# Patient Record
Sex: FEMALE | Race: WHITE | NOT HISPANIC OR LATINO | Employment: FULL TIME | URBAN - METROPOLITAN AREA
[De-identification: names, ages, dates, MRNs, and addresses within clinical notes are randomized per-mention and may not be internally consistent; named-entity substitution may affect disease eponyms.]

---

## 2017-01-10 ENCOUNTER — OFFICE VISIT (OUTPATIENT)
Dept: FAMILY MEDICINE | Facility: CLINIC | Age: 47
End: 2017-01-10
Payer: COMMERCIAL

## 2017-01-10 VITALS
TEMPERATURE: 97.4 F | BODY MASS INDEX: 29.32 KG/M2 | DIASTOLIC BLOOD PRESSURE: 72 MMHG | HEIGHT: 65 IN | WEIGHT: 176 LBS | SYSTOLIC BLOOD PRESSURE: 122 MMHG | HEART RATE: 70 BPM

## 2017-01-10 DIAGNOSIS — N91.2 AMENORRHEA: ICD-10-CM

## 2017-01-10 DIAGNOSIS — F41.9 ANXIETY: ICD-10-CM

## 2017-01-10 DIAGNOSIS — R22.0 NASAL SWELLING: ICD-10-CM

## 2017-01-10 DIAGNOSIS — J45.21 MILD INTERMITTENT ASTHMA WITH ACUTE EXACERBATION: ICD-10-CM

## 2017-01-10 DIAGNOSIS — Z12.31 VISIT FOR SCREENING MAMMOGRAM: ICD-10-CM

## 2017-01-10 DIAGNOSIS — Z30.432 ENCOUNTER FOR IUD REMOVAL: ICD-10-CM

## 2017-01-10 DIAGNOSIS — J30.89 SEASONAL ALLERGIC RHINITIS DUE TO OTHER ALLERGIC TRIGGER: ICD-10-CM

## 2017-01-10 DIAGNOSIS — F51.01 PRIMARY INSOMNIA: ICD-10-CM

## 2017-01-10 DIAGNOSIS — R07.9 CHEST PAIN, UNSPECIFIED TYPE: ICD-10-CM

## 2017-01-10 DIAGNOSIS — Z13.220 LIPID SCREENING: ICD-10-CM

## 2017-01-10 DIAGNOSIS — H10.45 CHRONIC ALLERGIC CONJUNCTIVITIS: ICD-10-CM

## 2017-01-10 DIAGNOSIS — Z13.1 SCREENING FOR DIABETES MELLITUS: ICD-10-CM

## 2017-01-10 DIAGNOSIS — Z00.01 ENCOUNTER FOR ROUTINE ADULT HEALTH EXAMINATION WITH ABNORMAL FINDINGS: Primary | ICD-10-CM

## 2017-01-10 LAB
CHOLEST SERPL-MCNC: 196 MG/DL
FSH SERPL-ACNC: 8.2 IU/L
GLUCOSE SERPL-MCNC: 94 MG/DL (ref 70–99)
HDLC SERPL-MCNC: 86 MG/DL
LDLC SERPL CALC-MCNC: 92 MG/DL
NONHDLC SERPL-MCNC: 110 MG/DL
TRIGL SERPL-MCNC: 90 MG/DL

## 2017-01-10 PROCEDURE — 58301 REMOVE INTRAUTERINE DEVICE: CPT | Performed by: PHYSICIAN ASSISTANT

## 2017-01-10 PROCEDURE — 36415 COLL VENOUS BLD VENIPUNCTURE: CPT | Performed by: PHYSICIAN ASSISTANT

## 2017-01-10 PROCEDURE — 82947 ASSAY GLUCOSE BLOOD QUANT: CPT | Performed by: PHYSICIAN ASSISTANT

## 2017-01-10 PROCEDURE — 99396 PREV VISIT EST AGE 40-64: CPT | Mod: 25 | Performed by: PHYSICIAN ASSISTANT

## 2017-01-10 PROCEDURE — 99213 OFFICE O/P EST LOW 20 MIN: CPT | Mod: 25 | Performed by: PHYSICIAN ASSISTANT

## 2017-01-10 PROCEDURE — 93000 ELECTROCARDIOGRAM COMPLETE: CPT | Mod: 59 | Performed by: PHYSICIAN ASSISTANT

## 2017-01-10 PROCEDURE — 80061 LIPID PANEL: CPT | Performed by: PHYSICIAN ASSISTANT

## 2017-01-10 PROCEDURE — 83001 ASSAY OF GONADOTROPIN (FSH): CPT | Performed by: PHYSICIAN ASSISTANT

## 2017-01-10 RX ORDER — TRAZODONE HYDROCHLORIDE 50 MG/1
25-50 TABLET, FILM COATED ORAL AT BEDTIME
Qty: 30 TABLET | Refills: 1 | Status: SHIPPED | OUTPATIENT
Start: 2017-01-10 | End: 2017-12-15

## 2017-01-10 RX ORDER — FLUTICASONE PROPIONATE 110 UG/1
2 AEROSOL, METERED RESPIRATORY (INHALATION) 2 TIMES DAILY
Qty: 1 INHALER | Refills: 1 | Status: SHIPPED | OUTPATIENT
Start: 2017-01-10 | End: 2017-08-09

## 2017-01-10 RX ORDER — CETIRIZINE HYDROCHLORIDE 10 MG/1
10 TABLET ORAL EVERY EVENING
Qty: 90 TABLET | Refills: 3 | Status: SHIPPED | OUTPATIENT
Start: 2017-01-10 | End: 2017-12-15

## 2017-01-10 RX ORDER — ALBUTEROL SULFATE 90 UG/1
2 AEROSOL, METERED RESPIRATORY (INHALATION) EVERY 6 HOURS PRN
Qty: 1 INHALER | Refills: 0 | Status: SHIPPED | OUTPATIENT
Start: 2017-01-10 | End: 2017-07-21

## 2017-01-10 RX ORDER — OLOPATADINE HYDROCHLORIDE 1 MG/ML
1 SOLUTION/ DROPS OPHTHALMIC 2 TIMES DAILY
Qty: 1 BOTTLE | Refills: 12 | Status: SHIPPED | OUTPATIENT
Start: 2017-01-10 | End: 2017-12-15

## 2017-01-10 RX ORDER — FLUTICASONE PROPIONATE 50 MCG
2 SPRAY, SUSPENSION (ML) NASAL DAILY
Qty: 16 G | Refills: 11 | Status: SHIPPED | OUTPATIENT
Start: 2017-01-10 | End: 2017-07-21

## 2017-01-10 RX ORDER — ALPRAZOLAM 0.5 MG
0.5 TABLET ORAL 3 TIMES DAILY PRN
Qty: 30 TABLET | Refills: 1 | Status: SHIPPED | OUTPATIENT
Start: 2017-01-10 | End: 2017-12-15

## 2017-01-10 ASSESSMENT — PATIENT HEALTH QUESTIONNAIRE - PHQ9: 5. POOR APPETITE OR OVEREATING: NOT AT ALL

## 2017-01-10 ASSESSMENT — ANXIETY QUESTIONNAIRES
5. BEING SO RESTLESS THAT IT IS HARD TO SIT STILL: NOT AT ALL
6. BECOMING EASILY ANNOYED OR IRRITABLE: SEVERAL DAYS
2. NOT BEING ABLE TO STOP OR CONTROL WORRYING: NOT AT ALL
1. FEELING NERVOUS, ANXIOUS, OR ON EDGE: SEVERAL DAYS
7. FEELING AFRAID AS IF SOMETHING AWFUL MIGHT HAPPEN: NOT AT ALL
GAD7 TOTAL SCORE: 2
3. WORRYING TOO MUCH ABOUT DIFFERENT THINGS: NOT AT ALL

## 2017-01-10 NOTE — Clinical Note
[facility]  [address] [place] [id number] [phone]      January 11, 2017      Hannah Sung  [address]   [facility] [address]          Dear Ms. Sung    The results of your recent lab tests were within normal limits. Enclosed is a copy of these results.  If you have any further questions or problems, please contact our office.    Sincerely,      Lenora Amor PA-C/ap    Results for orders placed or performed in visit on 01/10/17   Follicle stimulating hormone   Result Value Ref Range    FSH 8.2 IU/L   Lipid panel reflex to direct LDL   Result Value Ref Range    Cholesterol 196 <200 mg/dL    Triglycerides 90 <150 mg/dL    HDL Cholesterol 86 >49 mg/dL    LDL Cholesterol Calculated 92 <100 mg/dL    Non HDL Cholesterol 110 <130 mg/dL   Glucose   Result Value Ref Range    Glucose 94 70 - 99 mg/dL

## 2017-01-10 NOTE — NURSING NOTE
"Chief Complaint   Patient presents with     Physical     Flu Shot     due     Other     due for health maintenance     Refill Request     Allergies     have been very bad the last few days, may be mold issues in her office       Initial /72 mmHg  Pulse 70  Temp(Src) 97.4  F (36.3  C) (Oral)  Ht 5' 4.5\" (1.638 m)  Wt 176 lb (79.833 kg)  BMI 29.75 kg/m2 Estimated body mass index is 29.75 kg/(m^2) as calculated from the following:    Height as of this encounter: 5' 4.5\" (1.638 m).    Weight as of this encounter: 176 lb (79.833 kg).  BP completed using cuff size: veronica Potts MA      "

## 2017-01-10 NOTE — Clinical Note
[address] [id number] [phone]      January 18, 2017      Hannah Sung  [address]   [facility] [address]              Dear Hannah,    Your FSH shows you are still menstruating. If your bleeding becomes a problem, or you desire contraception, please let me know. You should expect to have a period again now that your IUD is out.  Please let me know if you have further questions or concerns.    Results for orders placed or performed in visit on 01/10/17   Follicle stimulating hormone   Result Value Ref Range    FSH 8.2 IU/L   Lipid panel reflex to direct LDL   Result Value Ref Range    Cholesterol 196 <200 mg/dL    Triglycerides 90 <150 mg/dL    HDL Cholesterol 86 >49 mg/dL    LDL Cholesterol Calculated 92 <100 mg/dL    Non HDL Cholesterol 110 <130 mg/dL   Glucose   Result Value Ref Range    Glucose 94 70 - 99 mg/dL           Sincerely,    Lenora Amor PA-C

## 2017-01-10 NOTE — PROGRESS NOTES
SUBJECTIVE:     CC: Hannah Sung is an 46 year old woman who presents for preventive health visit.     Healthy Habits:    Do you get at least three servings of calcium containing foods daily (dairy, green leafy vegetables, etc.)? Yes, yogurt daily, cheese also    Amount of exercise or daily activities, outside of work: 4-5 day(s) per week    Problems taking medications regularly No    Medication side effects: No    Have you had an eye exam in the past two years? yes    Do you see a dentist twice per year? No, due for one, last seen in spring of last year    Do you have sleep apnea, excessive snoring or daytime drowsiness?no    1. History of asthma. Using albuterol almost daily right now because of allergens. She states her allergies have been very bad the last few days, worried her office has mold issues. She stated they will be testing the office with a mold kit. Eyes are very red and itchy  2.swelling of outside of nose on and off for a few months. Not sure if realted to allergies.  3. Overall, anxiety is much better, uses xanax very sparingly.   4. Discuss birth control, time for her Mirena to come out, open to options. Not sexually active. Wondering about what period will be like when this comes out. Very mild spotting here and there  5. New chest pain that occurred one time several weeks ago at rest. She states it was left sided sharp pain that lasted for minutes -- maybe up to an hour. Denies shortness of breath, diaphoresis. She exercises almost daily without symptoms.     Today's PHQ-2 Score:   PHQ-2 ( 1999 [employer]) 1/10/2017 6/16/2015   Q1: Little interest or pleasure in doing things 0 0   Q2: Feeling down, depressed or hopeless 0 0   PHQ-2 Score 0 0       Abuse: Current or Past(Physical, Sexual or Emotional)- No  Do you feel safe in your environment - Yes    Social History   Substance Use Topics     Smoking status: Never Smoker      Smokeless tobacco: Never Used     Alcohol Use: Yes      Comment: wine  "occasionally     The patient does not drink >3 drinks per day nor >7 drinks per week.    Recent Labs   Lab Test  04/21/15   0933   CHOL  189   HDL  96   LDL  77   TRIG  78   CHOLHDLRATIO  2.0       Reviewed orders with patient.  Reviewed health maintenance and updated orders accordingly - Yes    Mammo Decision Support:  Patient under age 50, mutual decision reflected in health maintenance.      Pertinent mammograms are reviewed under the imaging tab.  History of abnormal Pap smear: NO - age 30- 65 PAP every 3 years recommended  All Histories reviewed and updated in Epic.      ROS:  A pertinent ROS of the General  HEENT  Cardiovascular  Pulmonary  GI  Musculoskeletal   Neurological  Integumentary  Psychological   systems is otherwise unremarkable.         Problem list, Medication list, Allergies, and Medical/Social/Surgical histories reviewed in [facility] and updated as appropriate.  OBJECTIVE:     /72 mmHg  Pulse 70  Temp(Src) 97.4  F (36.3  C) (Oral)  Ht 5' 4.5\" (1.638 m)  Wt 176 lb (79.833 kg)  BMI 29.75 kg/m2  EXAM:  GENERAL: healthy, alert and no distress  EYES: Eyes grossly normal to inspection, PERRL and conjunctivae and sclerae normal  HENT: ear canals and TM's normal, nose and mouth without ulcers or lesions,  slight raised area of left side of external nose  NECK: no adenopathy, no asymmetry, masses, or scars and thyroid normal to palpation  RESP: lungs clear to auscultation - no rales, rhonchi or wheezes  BREAST: normal without masses, tenderness or nipple discharge and no palpable axillary masses or adenopathy  CV: regular rate and rhythm, normal S1 S2, no S3 or S4, no murmur, click or rub, no peripheral edema and peripheral pulses strong  ABDOMEN: soft, nontender, no hepatosplenomegaly, no masses and bowel sounds normal   (female): normal female external genitalia, normal urethral meatus, vaginal mucosa pink, moist, well rugated, and normal cervix/adnexa/uterus without masses or discharge   " (female): IUD in place, removed without difficulty, some mild bleeding in vaginal vault.  MS: no gross musculoskeletal defects noted, no edema  SKIN: no suspicious lesions or rashes  NEURO: Normal strength and tone, mentation intact and speech normal  PSYCH: mentation appears normal, affect normal/bright    ASSESSMENT/PLAN:     1. Encounter for routine adult health examination with abnormal findings    2. Mild intermittent asthma with acute exacerbation  Refills, add Flovent to regimen.   Make sure to take allergy meds.  Recheck in 1 month  - albuterol (PROAIR HFA/PROVENTIL HFA/VENTOLIN HFA) 108 (90 BASE) MCG/ACT Inhaler; Inhale 2 puffs into the lungs every 6 hours as needed for shortness of breath / dyspnea or wheezing Due for a visit for refills!!!  Dispense: 1 Inhaler; Refill: 0  - fluticasone (FLOVENT HFA) 110 MCG/ACT Inhaler; Inhale 2 puffs into the lungs 2 times daily  Dispense: 1 Inhaler; Refill: 1  - cetirizine (ZYRTEC) 10 MG tablet; Take 1 tablet (10 mg) by mouth every evening  Dispense: 90 tablet; Refill: 3  - OFFICE/OUTPT VISIT,EST,LEVL III    3. Seasonal allergic rhinitis due to other allergic trigger  - albuterol (PROAIR HFA/PROVENTIL HFA/VENTOLIN HFA) 108 (90 BASE) MCG/ACT Inhaler; Inhale 2 puffs into the lungs every 6 hours as needed for shortness of breath / dyspnea or wheezing Due for a visit for refills!!!  Dispense: 1 Inhaler; Refill: 0  - fluticasone (FLONASE) 50 MCG/ACT spray; Spray 2 sprays into both nostrils daily  Dispense: 16 g; Refill: 11  - OFFICE/OUTPT VISIT,EST,LEVL III    4. Encounter for IUD removal  IUD Removal  IUD was removed without difficult. She had mild cramping afterwards, but otherwise normal.     5. Nasal swelling  Difficult to tell if this is skin related or nares related. REcommend flonase for allergies and if persists see derm.  - OFFICE/OUTPT VISIT,EST,LEVL III    6. Anxiety  Okay to refill xanax PRN.  - ALPRAZolam (XANAX) 0.5 MG tablet; Take 1 tablet (0.5 mg) by mouth 3  "times daily as needed for anxiety  Dispense: 30 tablet; Refill: 1  - OFFICE/OUTPT VISIT,EST,LEVL III    7. Chronic allergic conjunctivitis  Refill.  - olopatadine (PATANOL) 0.1 % ophthalmic solution; Place 1 drop into both eyes 2 times daily  Dispense: 1 Bottle; Refill: 12  - OFFICE/OUTPT VISIT,EST,LEVL III    8. Primary insomnia  Trial of meds for sleep.  - traZODone (DESYREL) 50 MG tablet; Take 0.5-1 tablets (25-50 mg) by mouth At Bedtime  Dispense: 30 tablet; Refill: 1  - OFFICE/OUTPT VISIT,EST,LEVL III    9. Amenorrhea  Discussed role of checking FSH now that IUD is out. She does not need contraception and will see how periods go without her IUD.  - Follicle stimulating hormone  - OFFICE/OUTPT VISIT,EST,LEVL III    11. Chest pain, unspecified type  Chest pain does not seem concerning for cardiac etiology. She exercises without difficulty. Will recheck in 1 month. Warned patient to proceed to ER with severe chest pain associated with diaphoresis, shortness of breath or exercise.   - EKG 12-lead complete w/read - Clinics    12. Lipid screening  - Lipid panel reflex to direct LDL    13. Screening for diabetes mellitus  - Glucose    14. Visit for screening mammogram  - MA SCREENING DIGITAL BILAT - Future  (s+30); Future    COUNSELING:   Reviewed preventive health counseling, as reflected in patient instructions       Regular exercise       Healthy diet/nutrition         reports that she has never smoked. She has never used smokeless tobacco.    Estimated body mass index is 29.75 kg/(m^2) as calculated from the following:    Height as of this encounter: 5' 4.5\" (1.638 m).    Weight as of this encounter: 176 lb (79.833 kg).   Weight management plan: Discussed healthy diet and exercise guidelines and patient will follow up in 1 month in clinic to re-evaluate.    Counseling Resources:  ATP IV Guidelines  Pooled Cohorts Equation Calculator  Breast Cancer Risk Calculator  FRAX Risk Assessment  ICSI Preventive " Guidelines  Dietary Guidelines for Americans, 2010  USDA's MyPlate  ASA Prophylaxis  Lung CA Screening    Lenora Amor PA-C  [facility]

## 2017-01-11 ASSESSMENT — ANXIETY QUESTIONNAIRES: GAD7 TOTAL SCORE: 2

## 2017-01-11 ASSESSMENT — PATIENT HEALTH QUESTIONNAIRE - PHQ9: SUM OF ALL RESPONSES TO PHQ QUESTIONS 1-9: 2

## 2017-01-11 ASSESSMENT — ASTHMA QUESTIONNAIRES: ACT_TOTALSCORE: 19

## 2017-01-27 ENCOUNTER — TELEPHONE (OUTPATIENT)
Dept: FAMILY MEDICINE | Facility: CLINIC | Age: 47
End: 2017-01-27

## 2017-01-27 DIAGNOSIS — N63.0 BREAST MASS: Primary | ICD-10-CM

## 2017-01-27 NOTE — TELEPHONE ENCOUNTER
Reason for Call: Request for an order or referral:    Order or referral being requested: [facility], needs order signed by MD    Date needed: as soon as possible    Has the patient been seen by the PCP for this problem? YES    Additional comments: [facility] calling, needs orders signed, needs to contact patient to schedule    Phone number Patient can be reached at:  Other phone number:  [facility], [phone]    Best Time:  any    Can we leave a detailed message on this number?  YES    Call taken on 1/27/2017 at 10:12 AM by Rubi Gonzalez

## 2017-01-29 ENCOUNTER — TELEPHONE (OUTPATIENT)
Dept: FAMILY MEDICINE | Facility: CLINIC | Age: 47
End: 2017-01-29

## 2017-01-30 NOTE — TELEPHONE ENCOUNTER
She was notified and has imaging scheduled for 2/7/17.     Mihaela Breaux RN   January 30, 2017 11:56 AM  [facility] Triage   [phone]

## 2017-01-30 NOTE — TELEPHONE ENCOUNTER
Hi team, received information that patient needs further breast imaging. Please call patient to make sure she is aware and help scheduling if necessary.   Thank you,    Lenora Amor, MICHAEL, PA-C  [facility]

## 2017-07-21 ENCOUNTER — TELEPHONE (OUTPATIENT)
Dept: FAMILY MEDICINE | Facility: CLINIC | Age: 47
End: 2017-07-21

## 2017-07-21 DIAGNOSIS — J30.89 SEASONAL ALLERGIC RHINITIS DUE TO OTHER ALLERGIC TRIGGER: ICD-10-CM

## 2017-07-21 DIAGNOSIS — J45.21 MILD INTERMITTENT ASTHMA WITH ACUTE EXACERBATION: ICD-10-CM

## 2017-07-21 RX ORDER — FLUTICASONE PROPIONATE 50 MCG
2 SPRAY, SUSPENSION (ML) NASAL DAILY
Qty: 16 G | Refills: 11 | Status: SHIPPED | OUTPATIENT
Start: 2017-07-21 | End: 2017-12-15

## 2017-07-21 RX ORDER — ALBUTEROL SULFATE 90 UG/1
2 AEROSOL, METERED RESPIRATORY (INHALATION) EVERY 6 HOURS PRN
Qty: 1 INHALER | Refills: 0 | Status: SHIPPED | OUTPATIENT
Start: 2017-07-21 | End: 2017-12-15

## 2017-07-21 NOTE — TELEPHONE ENCOUNTER
Routing refill request to provider for review/approval because:  Unable to approve under Mary Alicea's name, will route to current PCP      Fiona Arriaga RN  [facility]

## 2017-07-21 NOTE — TELEPHONE ENCOUNTER
Reason for Call:  Medication or medication refill:    Do you use a [facility]?  Name of the pharmacy and phone number for the current request:  [facility] in [place]    Name of the medication requested: albuterol (PROAIR HFA/PROVENTIL HFA/VENTOLIN HFA) 108 (90 BASE) MCG/ACT Inhaler    Other request: Pt is going out of town this weekend and needs it. Saw Lenora for a physical in January. Pt is not sure why she does not have refills available.    Can we leave a detailed message on this number? YES    Phone number patient can be reached at: Home number on file [phone] (home)    Best Time: any    Call taken on 7/21/2017 at 9:34 AM by Betty Harris

## 2017-07-31 NOTE — TELEPHONE ENCOUNTER
ACT=22. Informed that PCP is no longer with [facility], scheduled with another provider next week.  Rashmi Meyer RN

## 2017-08-01 ASSESSMENT — ASTHMA QUESTIONNAIRES: ACT_TOTALSCORE: 22

## 2017-08-09 ENCOUNTER — OFFICE VISIT (OUTPATIENT)
Dept: FAMILY MEDICINE | Facility: CLINIC | Age: 47
End: 2017-08-09
Payer: COMMERCIAL

## 2017-08-09 VITALS
SYSTOLIC BLOOD PRESSURE: 102 MMHG | TEMPERATURE: 98.4 F | DIASTOLIC BLOOD PRESSURE: 74 MMHG | HEART RATE: 60 BPM | HEIGHT: 65 IN

## 2017-08-09 DIAGNOSIS — J45.30 MILD PERSISTENT ASTHMA WITHOUT COMPLICATION: Primary | ICD-10-CM

## 2017-08-09 DIAGNOSIS — J30.2 SEASONAL ALLERGIC RHINITIS, UNSPECIFIED CHRONICITY, UNSPECIFIED TRIGGER: ICD-10-CM

## 2017-08-09 PROCEDURE — 99213 OFFICE O/P EST LOW 20 MIN: CPT | Performed by: PHYSICIAN ASSISTANT

## 2017-08-09 RX ORDER — FLUTICASONE PROPIONATE 110 UG/1
2 AEROSOL, METERED RESPIRATORY (INHALATION) 2 TIMES DAILY
Qty: 3 INHALER | Refills: 3 | Status: SHIPPED | OUTPATIENT
Start: 2017-08-09 | End: 2017-12-15

## 2017-08-09 NOTE — MR AVS SNAPSHOT
After Visit Summary   8/9/2017    Hannah Sung    MRN: [id number]           Patient Information     Date Of Birth          1970        Visit Information        Provider Department      8/9/2017 2:20 PM Sarah Simmons PA-C [facility]        Today's Diagnoses     Mild persistent asthma without complication    -  1    Seasonal allergic rhinitis, unspecified chronicity, unspecified trigger          Care Instructions    Call Allergy/Asthma to schedule an appointment  Refill sent to pharmacy.      ALYCIA Prince PA-C            Follow-ups after your visit        Additional Services     ALLERGY/ASTHMA ADULT REFERRAL       Your provider has referred you to: [facility]: [facility] [phone]  [email or web address]    Please be aware that coverage of these services is subject to the terms and limitations of your health insurance plan.  Call member services at your health plan with any benefit or coverage questions.      Please bring the following with you to your appointment:    (1) Any X-Rays, CTs or MRIs which have been performed.  Contact the facility where they were done to arrange for  prior to your scheduled appointment.    (2) List of current medications  (3) This referral request   (4) Any documents/labs given to you for this referral                  Who to contact     If you have questions or need follow up information about today's clinic visit or your schedule please contact [facility] directly at [phone].  Normal or non-critical lab and imaging results will be communicated to you by MyChart, letter or phone within 4 business days after the clinic has received the results. If you do not hear from us within 7 days, please contact the clinic through MyChart or phone. If you have a critical or abnormal lab result, we will notify you by phone as soon as possible.  Submit refill requests through  "[employer] or call your pharmacy and they will forward the refill request to us. Please allow 3 business days for your refill to be completed.          Additional Information About Your Visit        MyChart Information     [employer]iously[employer] lets you send messages to your doctor, view your test results, renew your prescriptions, schedule appointments and more. To sign up, go to [email or web address] . Click on \"Log in\" on the left side of the screen, which will take you to the Welcome page. Then click on \"Sign up Now\" on the right side of the page.     You will be asked to enter the access code listed below, as well as some personal information. Please follow the directions to create your username and password.     Your access code is: [email or web address]  Expires: 2017  3:25 PM     Your access code will  in 90 days. If you need help or a new code, please call your [place] clinic or [phone].        Care EveryWhere ID     This is your Care EveryWhere ID. This could be used by other organizations to access your [place] medical records  [id number]        Your Vitals Were     Pulse Temperature Height             60 98.4  F (36.9  C) (Oral) 5' 4.5\" (1.638 m)          Blood Pressure from Last 3 Encounters:   17 102/74   01/10/17 122/72   06/16/15 122/76    Weight from Last 3 Encounters:   01/10/17 176 lb (79.8 kg)   06/16/15 172 lb (78 kg)   06/04/15 173 lb 6.4 oz (78.7 kg)              We Performed the Following     ALLERGY/ASTHMA ADULT REFERRAL          Today's Medication Changes          These changes are accurate as of: 17  3:30 PM.  If you have any questions, ask your nurse or doctor.               Start taking these medicines.        Dose/Directions    order for DME   Used for:  Mild persistent asthma without complication   Started by:  Sarah Simmons PA-C        Nebulizer   Quantity:  1 Device   Refills:  0            Where to get your medicines      These medications were sent to [facility] - " [place] Mor[facility] - [address] [facility]   [address] [place] [address]     Phone:  [phone]     fluticasone 110 MCG/ACT Inhaler         Some of these will need a paper prescription and others can be bought over the counter.  Ask your nurse if you have questions.     Bring a paper prescription for each of these medications     order for DME                Primary Care Provider Office Phone # Fax #    Lenora Amor PA-C [phone]       [facility] [address]        Equal Access to Services     [facility]: Hadii aad ku hadasho Soomaali, waaxda luqadaha, qaybta kaalmada adematiasyada, mandi maddox . So [facility] [phone].    ATENCIÓN: Si habla español, tiene a hurtado disposición servicios gratuitos de asistencia lingüística. [facility] [phone].    We comply with applicable federal civil rights laws and [place] laws. We do not discriminate on the basis of race, color, national origin, age, disability sex, sexual orientation or gender identity.            Thank you!     Thank you for choosing [facility]  for your care. Our goal is always to provide you with excellent care. Hearing back from our patients is one way we can continue to improve our services. Please take a few minutes to complete the written survey that you may receive in the mail after your visit with us. Thank you!             Your Updated Medication List - Protect others around you: Learn how to safely use, store and throw away your medicines at www.disposemymeds.org.          This list is accurate as of: 8/9/17  3:30 PM.  Always use your most recent med list.                   Brand Name Dispense Instructions for use Diagnosis    * albuterol (5 MG/ML) 0.5% neb solution    PROVENTIL    30 vial    Take 0.5 mLs (2.5 mg) by nebulization every 6 hours as needed    Mild intermittent asthma       * albuterol 108 (90 BASE) MCG/ACT Inhaler     PROAIR HFA/PROVENTIL HFA/VENTOLIN HFA    1 Inhaler    Inhale 2 puffs into the lungs every 6 hours as needed for shortness of breath / dyspnea or wheezing    Mild intermittent asthma with acute exacerbation, Seasonal allergic rhinitis due to other allergic trigger       ALPRAZolam 0.5 MG tablet    XANAX    30 tablet    Take 1 tablet (0.5 mg) by mouth 3 times daily as needed for anxiety    Anxiety       cetirizine 10 MG tablet    zyrTEC    90 tablet    Take 1 tablet (10 mg) by mouth every evening    Mild intermittent asthma with acute exacerbation       fluticasone 110 MCG/ACT Inhaler    FLOVENT HFA    3 Inhaler    Inhale 2 puffs into the lungs 2 times daily    Mild persistent asthma without complication, Seasonal allergic rhinitis, unspecified chronicity, unspecified trigger       fluticasone 50 MCG/ACT spray    FLONASE    16 g    Spray 2 sprays into both nostrils daily    Seasonal allergic rhinitis due to other allergic trigger       levonorgestrel 20 MCG/24HR IUD    MIRENA     1 each by Intrauterine route once 04/2012        olopatadine 0.1 % ophthalmic solution    PATANOL    1 Bottle    Place 1 drop into both eyes 2 times daily    Chronic allergic conjunctivitis       order for DME     1 Device    Nebulizer    Mild persistent asthma without complication       traZODone 50 MG tablet    DESYREL    30 tablet    Take 0.5-1 tablets (25-50 mg) by mouth At Bedtime    Primary insomnia       * Notice:  This list has 2 medication(s) that are the same as other medications prescribed for you. Read the directions carefully, and ask your doctor or other care provider to review them with you.

## 2017-08-09 NOTE — PATIENT INSTRUCTIONS
Call Allergy/Asthma to schedule an appointment  Refill sent to pharmacy.      ALYCIA Prince, PAChelseaC

## 2017-08-09 NOTE — PROGRESS NOTES
SUBJECTIVE:                                                    Hannah Sung is a 47 year old female who presents to clinic today for the following health issues:      Asthma Follow-Up    Was ACT completed today?    Yes    ACT Total Scores 8/9/2017   ACT TOTAL SCORE -   ASTHMA ER VISITS -   ASTHMA HOSPITALIZATIONS -   ACT TOTAL SCORE (Goal Greater than or Equal to 20) 19   In the past 12 months, how many times did you visit the emergency room for your asthma without being admitted to the hospital? 0   In the past 12 months, how many times were you hospitalized overnight because of your asthma? 0       Recent asthma triggers that patient is dealing with: strong odors and fumes, exercise or sports and cold air    Amount of exercise or physical activity: 4-5 days/week for an average of greater than 60 minutes    Problems taking medications regularly: No    Medication side effects: none    Takes a Zyrtec D on days that she runs on the weekend.  Takes Flovent daily.    Diet: regular (no restrictions)    ALLERGIES     Onset: since childhood    Description:   Nasal congestion: no  Sneezing: YES, in the office at work  Red, itchy eyes: YES    Progression of Symptoms:  Worse in last week    Accompanying Signs & Symptoms:  Cough: no  Wheezing: YES- little bit during the day at work  Rash: no  Sinus/facial pain: no   History:   Is it seasonal: during any time of the year   History of Asthma: YES  Has allergy testing been done: YES    Precipitating factors:   Has been worse at work lately, they are testing office for mold today    Alleviating factors:  None    Hannah is not really aware what she is allergic to, but would like to find out.  Would like to avoid if possible, or seeks other type of treatment.       Therapies Tried and outcome: inhalers, zyrtec    -------------------------------------    Problem list and histories reviewed & adjusted, as indicated.  Additional history: as documented    Reviewed and updated as needed  "this visit by clinical staffTobacco  Allergies  Med Hx  Surg Hx  Fam Hx  Soc Hx      Reviewed and updated as needed this visit by Provider         ROS:  Constitutional, HEENT, cardiovascular, pulmonary, gi and gu systems are negative, except as otherwise noted.      OBJECTIVE:   /74 (Cuff Size: Adult Regular)  Pulse 60  Temp 98.4  F (36.9  C) (Oral)  Ht 5' 4.5\" (1.638 m)  There is no height or weight on file to calculate BMI.  GENERAL: healthy, alert and no distress  EYES: Eyes grossly normal to inspection, PERRL and conjunctivae and sclerae normal  HENT: ear canals and TM's normal, nose and mouth without ulcers or lesions  NECK: no adenopathy, no asymmetry, masses, or scars and thyroid normal to palpation  RESP: lungs clear to auscultation - no rales, rhonchi or wheezes  CV: regular rate and rhythm, normal S1 S2, no S3 or S4, no murmur, click or rub, no peripheral edema and peripheral pulses strong  MS: no gross musculoskeletal defects noted, no edema    Diagnostic Test Results:  none     ASSESSMENT/PLAN:   1. Mild persistent asthma without complication  2. Seasonal allergic rhinitis, unspecified chronicity, unspecified trigger  ACT of 19, exercise and allergy induced.  Continue current medications.  Pt interested in referral to better define what she is allergic to and to discuss alternate treatment options.  - ALLERGY/ASTHMA ADULT REFERRAL  - order for DME; Nebulizer  Dispense: 1 Device; Refill: 0  - fluticasone (FLOVENT HFA) 110 MCG/ACT Inhaler; Inhale 2 puffs into the lungs 2 times daily  Dispense: 3 Inhaler; Refill: 3    Patient Instructions   Call Allergy/Asthma to schedule an appointment  Refill sent to pharmacy.      ALYCIA Prince, PAChelseaC        Sarah Simmons PA-C  [facility]  "

## 2017-08-09 NOTE — NURSING NOTE
"Chief Complaint   Patient presents with     Asthma     Allergies       Initial /74 (Cuff Size: Adult Regular)  Pulse 60  Temp 98.4  F (36.9  C) (Oral)  Ht 5' 4.5\" (1.638 m) Estimated body mass index is 29.74 kg/(m^2) as calculated from the following:    Height as of 1/10/17: 5' 4.5\" (1.638 m).    Weight as of 1/10/17: 176 lb (79.8 kg).  Medication Reconciliation: humberto Mejias, Certified Medical Assistant (AAMA)     "

## 2017-08-10 ASSESSMENT — ASTHMA QUESTIONNAIRES: ACT_TOTALSCORE: 19

## 2017-12-07 ENCOUNTER — TELEPHONE (OUTPATIENT)
Dept: FAMILY MEDICINE | Facility: CLINIC | Age: 47
End: 2017-12-07

## 2017-12-07 NOTE — TELEPHONE ENCOUNTER
Reason for call:  Patient reporting a symptom    Symptom or request: dizziness, excessive thirst, post food poisoning (vomiting & diarrhea)    Duration (how long have symptoms been present): x 5 days    Have you been treated for this before? No    Additional comments: patient states she got food poisoning last weekend. Feels like she is dehydrated, has had headaches, dizziness.    Phone Number patient can be reached at:  Home number on file [phone] (home)    Best Time:  any    Can we leave a detailed message on this number:  YES    Call taken on 12/7/2017 at 9:27 AM by Rubi Gonzalez

## 2017-12-07 NOTE — TELEPHONE ENCOUNTER
"Phone call to patient. Reports 3 day history of GI illness - vomiting and diarrhea. Thinks she ate bad food at [employer] - no known exposure/recent travel.  Vomiting/diarrhea resolved 4 days ago but patient still feeling \"fuzzy\" at times. 2 days ago had an episode of dizziness while sitting. Headache yesterday but not so far today.  Feeling this is improving over time but slowly. No longer feeling dizzy today but still very fatigued and sleeping a lot. Able to work from home.  She is drinking plenty of fluids and eating well. Drinking water w/electrolytes.  Urination is normal and in good amounts.     Advised office visit to evaluate as patient may need labs drawn if symptoms persist. Patient agreeable.  Reviewed warning signs and when to seek urgent medical attention.  Encouraged continued home cares.  Patient verbalized understanding.    RN followed protocol found in Telephone Triage Protocols for Nurses (fifth edition) by Emilia Sanchez.    RUDY Schultz RN    "

## 2017-12-15 ENCOUNTER — OFFICE VISIT (OUTPATIENT)
Dept: FAMILY MEDICINE | Facility: CLINIC | Age: 47
End: 2017-12-15
Payer: COMMERCIAL

## 2017-12-15 VITALS
DIASTOLIC BLOOD PRESSURE: 70 MMHG | TEMPERATURE: 98.3 F | SYSTOLIC BLOOD PRESSURE: 118 MMHG | WEIGHT: 170 LBS | BODY MASS INDEX: 28.73 KG/M2

## 2017-12-15 DIAGNOSIS — J30.2 SEASONAL ALLERGIC RHINITIS, UNSPECIFIED CHRONICITY, UNSPECIFIED TRIGGER: ICD-10-CM

## 2017-12-15 DIAGNOSIS — F41.9 ANXIETY: ICD-10-CM

## 2017-12-15 DIAGNOSIS — D22.9 NUMEROUS MOLES: ICD-10-CM

## 2017-12-15 DIAGNOSIS — F51.01 PRIMARY INSOMNIA: ICD-10-CM

## 2017-12-15 DIAGNOSIS — J45.30 MILD PERSISTENT ASTHMA WITHOUT COMPLICATION: Primary | ICD-10-CM

## 2017-12-15 DIAGNOSIS — H10.45 CHRONIC ALLERGIC CONJUNCTIVITIS: ICD-10-CM

## 2017-12-15 DIAGNOSIS — L91.8 SKIN TAG: ICD-10-CM

## 2017-12-15 PROCEDURE — 99214 OFFICE O/P EST MOD 30 MIN: CPT | Performed by: PHYSICIAN ASSISTANT

## 2017-12-15 RX ORDER — ALBUTEROL SULFATE 90 UG/1
2 AEROSOL, METERED RESPIRATORY (INHALATION) EVERY 6 HOURS PRN
Qty: 1 INHALER | Refills: 0 | Status: SHIPPED | OUTPATIENT
Start: 2017-12-15 | End: 2018-04-25

## 2017-12-15 RX ORDER — ALBUTEROL SULFATE 5 MG/ML
2.5 SOLUTION RESPIRATORY (INHALATION) EVERY 6 HOURS PRN
Qty: 30 VIAL | Refills: 3 | Status: SHIPPED | OUTPATIENT
Start: 2017-12-15 | End: 2021-08-05

## 2017-12-15 RX ORDER — FLUTICASONE PROPIONATE 50 MCG
2 SPRAY, SUSPENSION (ML) NASAL DAILY
Qty: 16 G | Refills: 11 | Status: SHIPPED | OUTPATIENT
Start: 2017-12-15 | End: 2019-04-18

## 2017-12-15 RX ORDER — TRAZODONE HYDROCHLORIDE 50 MG/1
25-50 TABLET, FILM COATED ORAL AT BEDTIME
Qty: 30 TABLET | Refills: 1 | Status: SHIPPED | OUTPATIENT
Start: 2017-12-15

## 2017-12-15 RX ORDER — OLOPATADINE HYDROCHLORIDE 1 MG/ML
1 SOLUTION/ DROPS OPHTHALMIC 2 TIMES DAILY
Qty: 1 BOTTLE | Refills: 12 | Status: SHIPPED | OUTPATIENT
Start: 2017-12-15 | End: 2019-04-01

## 2017-12-15 RX ORDER — CETIRIZINE HYDROCHLORIDE 10 MG/1
10 TABLET ORAL EVERY EVENING
Qty: 90 TABLET | Refills: 3 | Status: SHIPPED | OUTPATIENT
Start: 2017-12-15 | End: 2018-08-13

## 2017-12-15 RX ORDER — ALPRAZOLAM 0.5 MG
0.5 TABLET ORAL 3 TIMES DAILY PRN
Qty: 30 TABLET | Refills: 1 | Status: SHIPPED | OUTPATIENT
Start: 2017-12-15 | End: 2018-08-13

## 2017-12-15 RX ORDER — FLUTICASONE PROPIONATE 110 UG/1
2 AEROSOL, METERED RESPIRATORY (INHALATION) 2 TIMES DAILY
Qty: 3 INHALER | Refills: 3 | Status: SHIPPED | OUTPATIENT
Start: 2017-12-15 | End: 2019-04-01

## 2017-12-15 NOTE — PATIENT INSTRUCTIONS
Try using the albuterol inhaler 30 minutes prior to exercise.  Schedule an appointment with Dermatology and Allergist if needed.    ALYCIA Prince, DORY    [facility]   Discharged by : Blanca BAILEY Certified Medical Assistant (AAMA)   Paper scripts provided to patient : 1   If you have any questions regarding to your visit please contact your care team:     Team Silver              Clinic Hours Telephone Number     Dr. Nabil Simmons PA-C   7am-7pm  Monday - Thursday   7am-5pm  Fridays  [phone]   (Appointment scheduling available 24/7)     RN Line  [phone] option 2     Urgent Care - [place] - 11am-9pm Monday-Friday Saturday-Sunday- 9am-5pm     [place] -   5pm-9pm Monday-Friday Saturday-Sunday- 9am-5pm    [phone] - [place]    [phone] - [place]     For a Price Quote for your services, please call our Consumer Price Line at [phone].     What options do I have for visits at the clinic other than the traditional office visit?     To expand how we care for you, many of our providers are utilizing electronic visits (e-visits) and telephone visits, when medically appropriate, for interactions with their patients rather than a visit in the clinic. We also offer nurse visits for many medical concerns. Just like any other service, we will bill your insurance company for this type of visit based on time spent on the phone with your provider. Not all insurance companies cover these visits. Please check with your medical insurance if this type of visit is covered. You will be responsible for any charges that are not paid by your insurance.   E-visits via [employer]: generally incur a $35.00 fee.     Telephone visits:   Time spent on the phone: *charged based on time that is spent on the phone in increments of 10 minutes. Estimated cost:   5-10 mins $30.00   11-20 mins. $59.00   21-30 mins.  $85.00     Use [employer] (secure email communication and access to your chart) to send your primary care provider a message or make an appointment. Ask someone on your Team how to sign up for [employer].     As always, Thank you for trusting us with your health care needs!      [facility]:    Scheduling Appointments  [facility]  Call: [phone]    [facility]  Call: [phone]    [facility]  Call: [phone]    For Gastroenterology referrals   [facility] Gastroenterology   Clinics and Surgery Center, 4th Floor   [address] [place] [address]   Appointments: [phone]    WHERE TO GO FOR CARE?  Clinic    Make an appointment if you:       Are sick (cold, cough, flu, sore throat, earache or in pain).       Have a small injury (sprain, small cut, burn or broken bone).       Need a physical exam, Pap smear, vaccine or prescription refill.       Have questions about your health or medicines.    To reach us:      Call [phone] ([phone]). Open 24 hours every day. (For counseling services, call [phone].)    Log into [employer] at [email or web address]. (Visit [email or web address] to create an account.) Hospital emergency room    An emergency is a serious or life- threatening problem that must be treated right away.    Call [phone] or get to the hospital if you have:      Very bad or sudden:            - Chest pain or pressure         - Bleeding         - Head or belly pain         - Dizziness or trouble seeing, walking or                          Speaking      Problems breathing      Blood in your vomit or you are coughing up blood      A major injury (knocked out, loss of a finger or limb, rape, broken bone protruding from skin)    A mental health crisis. (Or call the Mental Health Crisis line at 1-595.353.2161 or Suicide Prevention Hotline at 1-655.407.5268.)    Open 24 hours every day. You don't need an  appointment.     Urgent care    Visit urgent care for sickness or small injuries when the clinic is closed. You don't need an appointment. To check hours or find an urgent care near you, visit [email or web address]. Online care    Get online care from [facility] for more than 70 common problems, like colds, allergies and infections. Open 24 hours every day at:   [email or web address]   Need help deciding?    For advice about where to be seen, you may call your clinic and ask to speak with a nurse. We're here for you 24 hours every day.         If you are deaf or hard of hearing, please let us know. We provide many free services including sign language interpreters, oral interpreters, TTYs, telephone amplifiers, note takers and written materials.

## 2017-12-15 NOTE — MR AVS SNAPSHOT
After Visit Summary   12/15/2017    Hannah Sung    MRN: [id number]           Patient Information     Date Of Birth          1970        Visit Information        Provider Department      12/15/2017 9:40 AM Sarah Simmons PA-C [facility]        Today's Diagnoses     Mild persistent asthma without complication    -  1    Seasonal allergic rhinitis, unspecified chronicity, unspecified trigger        Anxiety        Chronic allergic conjunctivitis        Primary insomnia        Numerous moles          Care Instructions    Try using the albuterol inhaler 30 minutes prior to exercise.  Schedule an appointment with Dermatology and Allergist if needed.    ALYCIA Prince PA-C    [facility]   Discharged by : Blanca BAILEY Certified Medical Assistant (AAMA)   Paper scripts provided to patient : 1   If you have any questions regarding to your visit please contact your care team:     Team Silver              Clinic Hours Telephone Number     Dr. Nabil Simmons PA-C   7am-7pm  Monday - Thursday   7am-5pm  Fridays  [phone]   (Appointment scheduling available 24/7)     RN Line  [phone] option 2     Urgent Care - [place] and [facility] - 11am-9pm Monday-Friday Saturday-Sunday- 9am-5pm     [place] -   5pm-9pm Monday-Friday Saturday-Sunday- 9am-5pm    [phone] - [place]    [phone] - [place]     For a Price Quote for your services, please call our Consumer Price Line at [phone].     What options do I have for visits at the clinic other than the traditional office visit?     To expand how we care for you, many of our providers are utilizing electronic visits (e-visits) and telephone visits, when medically appropriate, for interactions with their patients rather than a visit in the clinic. We also offer nurse visits for many medical concerns. Just like any other  service, we will bill your insurance company for this type of visit based on time spent on the phone with your provider. Not all insurance companies cover these visits. Please check with your medical insurance if this type of visit is covered. You will be responsible for any charges that are not paid by your insurance.   E-visits via [employer]: generally incur a $35.00 fee.     Telephone visits:   Time spent on the phone: *charged based on time that is spent on the phone in increments of 10 minutes. Estimated cost:   5-10 mins $30.00   11-20 mins. $59.00   21-30 mins. $85.00     Use [employer] (secure email communication and access to your chart) to send your primary care provider a message or make an appointment. Ask someone on your Team how to sign up for [employer].     As always, Thank you for trusting us with your health care needs!      [facility]:    Scheduling Appointments  [facility]  Call: [phone]    [facility]  Call: [phone]    [facility]  Call: [phone]    For Gastroenterology referrals   [facility] Gastroenterology   Clinics and Surgery Center, 4th Floor   [address] [place] [address]   Appointments: [phone]    WHERE TO GO FOR CARE?  Clinic    Make an appointment if you:       Are sick (cold, cough, flu, sore throat, earache or in pain).       Have a small injury (sprain, small cut, burn or broken bone).       Need a physical exam, Pap smear, vaccine or prescription refill.       Have questions about your health or medicines.    To reach us:      Call [phone] ([phone]). Open 24 hours every day. (For counseling services, call [phone].)    Log into [employer] at [email or web address]. (Visit [email or web address] to create an account.) Hospital emergency room    An emergency is a serious or life- threatening problem that must be treated right away.    Call [phone] or get to the  hospital if you have:      Very bad or sudden:            - Chest pain or pressure         - Bleeding         - Head or belly pain         - Dizziness or trouble seeing, walking or                          Speaking      Problems breathing      Blood in your vomit or you are coughing up blood      A major injury (knocked out, loss of a finger or limb, rape, broken bone protruding from skin)    A mental health crisis. (Or call the Mental Health Crisis line at 1-596.451.7792 or Suicide Prevention Hotline at 1-165.350.6406.)    Open 24 hours every day. You don't need an appointment.     Urgent care    Visit urgent care for sickness or small injuries when the clinic is closed. You don't need an appointment. To check hours or find an urgent care near you, visit [email or web address]. Online care    Get online care from [email or web address] for more than 70 common problems, like colds, allergies and infections. Open 24 hours every day at:   [email or web address]   Need help deciding?    For advice about where to be seen, you may call your clinic and ask to speak with a nurse. We're here for you 24 hours every day.         If you are deaf or hard of hearing, please let us know. We provide many free services including sign language interpreters, oral interpreters, TTYs, telephone amplifiers, note takers and written materials.               Follow-ups after your visit        Additional Services     DERMATOLOGY REFERRAL       Your provider has referred you to: [place]: [facility] [place] [phone]   [email or web address]    Please be aware that coverage of these services is subject to the terms and limitations of your health insurance plan.  Call member services at your health plan with any benefit or coverage questions.      Please bring the following with you to your appointment:    (1) Any X-Rays, CTs or MRIs which have been performed.  Contact the facility where they were done to arrange for  prior to your  "scheduled appointment.    (2) List of current medications  (3) This referral request   (4) Any documents/labs given to you for this referral                  Who to contact     If you have questions or need follow up information about today's clinic visit or your schedule please contact [facility] directly at [phone].  Normal or non-critical lab and imaging results will be communicated to you by MyChart, letter or phone within 4 business days after the clinic has received the results. If you do not hear from us within 7 days, please contact the clinic through [employer] or phone. If you have a critical or abnormal lab result, we will notify you by phone as soon as possible.  Submit refill requests through [employer] or call your pharmacy and they will forward the refill request to us. Please allow 3 business days for your refill to be completed.          Additional Information About Your Visit        MyChart Information     [employer] lets you send messages to your doctor, view your test results, renew your prescriptions, schedule appointments and more. To sign up, go to [email or web address] . Click on \"Log in\" on the left side of the screen, which will take you to the Welcome page. Then click on \"Sign up Now\" on the right side of the page.     You will be asked to enter the access code listed below, as well as some personal information. Please follow the directions to create your username and password.     Your access code is: [email or web address]  Expires: 3/8/2018  4:07 PM     Your access code will  in 90 days. If you need help or a new code, please call your [facility] or [phone].        Care EveryWhere ID     This is your Care EveryWhere ID. This could be used by other organizations to access your [place] medical records  [id number]        Your Vitals Were     Temperature BMI (Body Mass Index)                98.3  F (36.8  C) (Oral) 28.73 kg/m2           Blood Pressure from Last 3 " Encounters:   12/15/17 118/70   08/09/17 102/74   01/10/17 122/72    Weight from Last 3 Encounters:   12/15/17 170 lb (77.1 kg)   01/10/17 176 lb (79.8 kg)   06/16/15 172 lb (78 kg)              We Performed the Following     DERMATOLOGY REFERRAL          Where to get your medicines      These medications were sent to [facility] - [place], [place] - [address]  [address] [facility] [address]     Phone:  [phone]     albuterol (5 MG/ML) 0.5% neb solution    albuterol 108 (90 BASE) MCG/ACT Inhaler    cetirizine 10 MG tablet    fluticasone 110 MCG/ACT Inhaler    fluticasone 50 MCG/ACT spray    olopatadine 0.1 % ophthalmic solution    traZODone 50 MG tablet         Some of these will need a paper prescription and others can be bought over the counter.  Ask your nurse if you have questions.     Bring a paper prescription for each of these medications     ALPRAZolam 0.5 MG tablet          Primary Care Provider Fax #    Provider Not In System [phone]                Equal Access to Services     MELLISSA JORDAN : Hadii shelbie Camara, wachelyda aston, qamandeep kaalroseline donato, mandi maddox . So [facility] [phone].    ATENCIÓN: Si habla español, tiene a hurtado disposición servicios gratuitos de asistencia lingüística. [facility] [phone].    We comply with applicable federal civil rights laws and [place] laws. We do not discriminate on the basis of race, color, national origin, age, disability, sex, sexual orientation, or gender identity.            Thank you!     Thank you for choosing [facility]  for your care. Our goal is always to provide you with excellent care. Hearing back from our patients is one way we can continue to improve our services. Please take a few minutes to complete the written survey that you may receive in the mail after your visit with us. Thank you!             Your Updated Medication List - Protect  others around you: Learn how to safely use, store and throw away your medicines at www.disposemymeds.org.          This list is accurate as of: 12/15/17 10:34 AM.  Always use your most recent med list.                   Brand Name Dispense Instructions for use Diagnosis    * albuterol (5 MG/ML) 0.5% neb solution    PROVENTIL    30 vial    Take 0.5 mLs (2.5 mg) by nebulization every 6 hours as needed    Mild persistent asthma without complication       * albuterol 108 (90 BASE) MCG/ACT Inhaler    PROAIR HFA/PROVENTIL HFA/VENTOLIN HFA    1 Inhaler    Inhale 2 puffs into the lungs every 6 hours as needed for shortness of breath / dyspnea or wheezing    Mild persistent asthma without complication       ALPRAZolam 0.5 MG tablet    XANAX    30 tablet    Take 1 tablet (0.5 mg) by mouth 3 times daily as needed for anxiety    Anxiety       cetirizine 10 MG tablet    zyrTEC    90 tablet    Take 1 tablet (10 mg) by mouth every evening    Chronic allergic conjunctivitis       fluticasone 110 MCG/ACT Inhaler    FLOVENT HFA    3 Inhaler    Inhale 2 puffs into the lungs 2 times daily    Mild persistent asthma without complication, Seasonal allergic rhinitis, unspecified chronicity, unspecified trigger       fluticasone 50 MCG/ACT spray    FLONASE    16 g    Spray 2 sprays into both nostrils daily    Chronic allergic conjunctivitis       levonorgestrel 20 MCG/24HR IUD    MIRENA     1 each by Intrauterine route once 04/2012        olopatadine 0.1 % ophthalmic solution    PATANOL    1 Bottle    Place 1 drop into both eyes 2 times daily    Chronic allergic conjunctivitis       order for DME     1 Device    Nebulizer    Mild persistent asthma without complication       traZODone 50 MG tablet    DESYREL    30 tablet    Take 0.5-1 tablets (25-50 mg) by mouth At Bedtime    Primary insomnia       * Notice:  This list has 2 medication(s) that are the same as other medications prescribed for you. Read the directions carefully, and ask your  doctor or other care provider to review them with you.

## 2017-12-15 NOTE — PROGRESS NOTES
SUBJECTIVE:   Hannah Sung is a 47 year old female who presents to clinic today for the following health issues:    Asthma Follow-Up    Was ACT completed today?    Yes    ACT Total Scores 12/15/2017   ACT TOTAL SCORE -   ASTHMA ER VISITS -   ASTHMA HOSPITALIZATIONS -   ACT TOTAL SCORE (Goal Greater than or Equal to 20) 20   In the past 12 months, how many times did you visit the emergency room for your asthma without being admitted to the hospital? 0   In the past 12 months, how many times were you hospitalized overnight because of your asthma? 0     Recent asthma triggers that patient is dealing with: stress    Has had a few wheezing episodes this week and this is rare. Out of her Flovent so this is likely contributing.  Low on nebulizer medication.  Has not been to allergy yet, but plans to make appointment in the near future.       Amount of exercise or physical activity: 4-5 days/week for an average of greater than 60 minutes    Problems taking medications regularly: No    Medication side effects: none    Diet: regular (no restrictions)    Anxiety Follow-Up    Status since last visit: No change, well controlled.  Very rare use of alprazolam and trazodone for anxiety and anxiety related sleep problems.      Other associated symptoms:None    Complicating factors:   Significant life event: No   Current substance abuse: None  Depression symptoms: No    Has a couple of moles that she would like looked at. Has FH of skin cancer in her mother.    DHARA-7 SCORE 6/21/2015 1/10/2017   Total Score 12 -   Total Score - 2       GAD7    Problem list and histories reviewed & adjusted, as indicated.  Additional history: as documented    Reviewed and updated as needed this visit by clinical staffTobacco  Allergies  Problems  Med Hx  Surg Hx  Fam Hx  Soc Hx      Reviewed and updated as needed this visit by Provider  Problems         ROS:  Constitutional, HEENT, cardiovascular, pulmonary, gi and gu systems are negative,  except as otherwise noted.      OBJECTIVE:   /70  Temp 98.3  F (36.8  C) (Oral)  Wt 170 lb (77.1 kg)  BMI 28.73 kg/m2  Body mass index is 28.73 kg/(m^2).  GENERAL: healthy, alert and no distress  RESP: lungs clear to auscultation - no rales, rhonchi or wheezes  CV: regular rate and rhythm, normal S1 S2, no S3 or S4, no murmur, click or rub, no peripheral edema and peripheral pulses strong  SKIN: no suspicious lesions or rashes. Small skin tag to mid back.  PSYCH: mentation appears normal, affect normal/bright    Diagnostic Test Results:  none     ASSESSMENT/PLAN:   1. Mild persistent asthma without complication  Stable well controlled  Encouraged her to use her albuterol prior to exercise.  - albuterol (PROVENTIL) (5 MG/ML) 0.5% neb solution; Take 0.5 mLs (2.5 mg) by nebulization every 6 hours as needed  Dispense: 30 vial; Refill: 3  - fluticasone (FLOVENT HFA) 110 MCG/ACT Inhaler; Inhale 2 puffs into the lungs 2 times daily  Dispense: 3 Inhaler; Refill: 3  - albuterol (PROAIR HFA/PROVENTIL HFA/VENTOLIN HFA) 108 (90 BASE) MCG/ACT Inhaler; Inhale 2 puffs into the lungs every 6 hours as needed for shortness of breath / dyspnea or wheezing  Dispense: 1 Inhaler; Refill: 0    2. Anxiety  Stable, well controlled.  - ALPRAZolam (XANAX) 0.5 MG tablet; Take 1 tablet (0.5 mg) by mouth 3 times daily as needed for anxiety  Dispense: 30 tablet; Refill: 1    3. Seasonal allergic rhinitis, unspecified chronicity, unspecified trigger  4. Chronic allergic conjunctivitis  Continue current medication regimen  Follow up with allergist when able.  - fluticasone (FLONASE) 50 MCG/ACT spray; Spray 2 sprays into both nostrils daily  Dispense: 16 g; Refill: 11  - olopatadine (PATANOL) 0.1 % ophthalmic solution; Place 1 drop into both eyes 2 times daily  Dispense: 1 Bottle; Refill: 12  - cetirizine (ZYRTEC) 10 MG tablet; Take 1 tablet (10 mg) by mouth every evening  Dispense: 90 tablet; Refill: 3    5. Primary insomnia  - traZODone  (DESYREL) 50 MG tablet; Take 0.5-1 tablets (25-50 mg) by mouth At Bedtime  Dispense: 30 tablet; Refill: 1    6. Numerous moles  Recommended yearly mole check  Referral provided.  - DERMATOLOGY REFERRAL    7. Skin tag  Mid lower back. Reassurance provided.    Sarah Simmons PA-C  [facility]

## 2017-12-16 ASSESSMENT — ASTHMA QUESTIONNAIRES: ACT_TOTALSCORE: 20

## 2018-02-05 ENCOUNTER — MYC MEDICAL ADVICE (OUTPATIENT)
Dept: FAMILY MEDICINE | Facility: CLINIC | Age: 48
End: 2018-02-05

## 2018-02-08 ENCOUNTER — OFFICE VISIT (OUTPATIENT)
Dept: OBGYN | Facility: CLINIC | Age: 48
End: 2018-02-08
Payer: COMMERCIAL

## 2018-02-08 VITALS
WEIGHT: 170 LBS | HEART RATE: 69 BPM | BODY MASS INDEX: 28.73 KG/M2 | SYSTOLIC BLOOD PRESSURE: 125 MMHG | DIASTOLIC BLOOD PRESSURE: 80 MMHG | TEMPERATURE: 98.1 F

## 2018-02-08 DIAGNOSIS — Z13.9 SCREENING PROCEDURE: ICD-10-CM

## 2018-02-08 DIAGNOSIS — Z30.430 ENCOUNTER FOR IUD INSERTION: Primary | ICD-10-CM

## 2018-02-08 LAB — BETA HCG QUAL IFA URINE: NEGATIVE

## 2018-02-08 PROCEDURE — 87491 CHLMYD TRACH DNA AMP PROBE: CPT | Performed by: OBSTETRICS & GYNECOLOGY

## 2018-02-08 PROCEDURE — 58300 INSERT INTRAUTERINE DEVICE: CPT | Performed by: OBSTETRICS & GYNECOLOGY

## 2018-02-08 PROCEDURE — 87591 N.GONORRHOEAE DNA AMP PROB: CPT | Performed by: OBSTETRICS & GYNECOLOGY

## 2018-02-08 PROCEDURE — 84703 CHORIONIC GONADOTROPIN ASSAY: CPT | Performed by: OBSTETRICS & GYNECOLOGY

## 2018-02-08 NOTE — PROGRESS NOTES
GYN clinic visit  2018    CC: IUD insertion    HPI:   Hannah Sung is a 47 year old  who presents to clinic today for an IUD insertion.  She has used Mirena for contraception in the past.  She denies current abnormal vaginal discharge. Her LMP was Patient's last menstrual period was 2018.. Her menses are regular, every 30 days for 5-7 days.  Last Pap smear was 4/21/15, NIL and HPV neg.     Reviewed GYN hx, OB hx, past medical hx, surgical hx and family hx.   Reviewed medications and allergies. Changes made in EPIC.     OBJECTIVE:  Vitals:    18 0923   BP: 125/80   Pulse: 69   Temp: 98.1  F (36.7  C)   TempSrc: Oral   Weight: 170 lb (77.1 kg)     Body mass index is 28.73 kg/(m^2).    Gen: alert, oriented, no distress, cooperative and pleasant  Abd: soft, nontender, nondistended, normoactive bowel sounds, no masses, no scars  : normal external genitalia without lesions or abnormalities. Normal Bartholin's, normal [place]'s, normal urethra. Normal vaginal mucosa, no abnormal discharge or lesions. Cervix appears WNL, no lesions or erythema. Bimanual exam reveals normal sized anteverted mobile uterus, no cervical motion tenderness, no uterine tenderness, no adnexal masses.    PROCEDURE:  Patient has verbalized understanding of risks and benefits. She was counseled on risks of infection, bleeding, uterine perforation, cervical laceration, expulsion, overall risk of pregnancy 2 in 1000, if she does get pregnant that there is an increased risk of ectopic pregnancy. All questions answered. She has signed the consent form.    Urine pregnancy test was negative.      A regular Graves speculum was placed in the vagina with good visualization of the cervix.  The cervix was then swabbed with a betadine x3.  Tenaculum was placed at the 12 o'clock position on the cervix and the uterus sounded to 9cm.  The Mirena  IUD was then placed in the usual fashion under sterile technique without difficulty.  Strings  were clipped about 2-3 cm from the cervical os.  Tenaculum was removed and cervix was hemostatic. There were no complications. The patient tolerated the procedure well.      ASSESSMENT:   Hannah Sung is a 47 year old  who had a Mirena IUD inserted today without complication.    PLAN:  1. Encounter for IUD insertion  - Beta HCG qual IFA urine  - HC LEVONORGESTREL IU 52MG 5 YR  - INSERTION INTRAUTERINE DEVICE  - levonorgestrel (MIRENA, 52 MG,) 20 MCG/24HR IUD; 1 each (20 mcg) by Intrauterine route once for 1 dose  Dispense: 1 each; Refill: 0    2. Screening procedure  - MA Screening Digital Bilateral; Future  - NEISSERIA GONORRHOEA PCR  - CHLAMYDIA TRACHOMATIS PCR      The patient should feel for the IUD strings in 2 weeks.  If unable to locate them, she should return to clinic for a speculum examination for confirmation that the IUD is in place. Bleeding pattern of this particular IUD was discussed with the patient. She is aware that the IUD will need to be removed in 5 years or PRN.  She is to return to clinic for her next annual or PRN.    Blanca Hines MD

## 2018-02-08 NOTE — NURSING NOTE
"Chief Complaint   Patient presents with     IUD     placement    NDC:04591-014-17  LOT: [id number] EXP:     Initial /80  Pulse 69  Temp 98.1  F (36.7  C) (Oral)  Wt 170 lb (77.1 kg)  BMI 28.73 kg/m2 Estimated body mass index is 28.73 kg/(m^2) as calculated from the following:    Height as of 17: 5' 4.5\" (1.638 m).    Weight as of this encounter: 170 lb (77.1 kg).  BP completed using cuff size: regular        The following HM Due: NONE      The following patient reported/Care Every where data was sent to:  P ABSTRACT QUALITY INITIATIVES [[id number]]  TOSHIA Gonzalez           "

## 2018-02-08 NOTE — MR AVS SNAPSHOT
After Visit Summary   2/8/2018    Hannah Sung    MRN: [id number]           Patient Information     Date Of Birth          1970        Visit Information        Provider Department      2/8/2018 9:00 AM Blanca Hines MD [facility] [place]        Today's Diagnoses     Encounter for IUD insertion    -  1    Screening procedure           Follow-ups after your visit        Future tests that were ordered for you today     Open Future Orders        Priority Expected Expires Ordered    MA Screening Digital Bilateral Routine  2/8/2019 2/8/2018            Who to contact     If you have questions or need follow up information about today's clinic visit or your schedule please contact [facility] [place] [phone].  Normal or non-critical lab and imaging results will be communicated to you by MyChart, letter or phone within 4 business days after the clinic has received the results. If you do not hear from us within 7 days, please contact the clinic through LiquidPracticehart or phone. If you have a critical or abnormal lab result, we will notify you by phone as soon as possible.  Submit refill requests through behaview or call your pharmacy and they will forward the refill request to us. Please allow 3 business days for your refill to be completed.          Additional Information About Your Visit        MyChart Information     behaview gives you secure access to your electronic health record. If you see a primary care provider, you can also send messages to your care team and make appointments. If you have questions, please call your primary care clinic.  If you do not have a primary care provider, please call [phone] and they will assist you.        Care EveryWhere ID     This is your Care EveryWhere ID. This could be used by other organizations to access your [place] medical records  [id number]        Your Vitals Were     Pulse Temperature BMI (Body Mass Index)              69 98.1  F (36.7  C) (Oral) 28.73 kg/m2          Blood Pressure from Last 3 Encounters:   02/08/18 125/80   12/15/17 118/70   08/09/17 102/74    Weight from Last 3 Encounters:   02/08/18 170 lb (77.1 kg)   12/15/17 170 lb (77.1 kg)   01/10/17 176 lb (79.8 kg)               Primary Care Provider Fax #    Provider Not In System [phone]                Equal Access to Services     VERA JORDAN : Hadii shelbie ku hadasho Soomaali, waaxda luqadaha, qaybta kaalmada adeegyada, mandi maddox . So [facility] [phone].    ATENCIÓN: Si habla español, tiene a hurtado disposición servicios gratuitos de asistencia lingüística. Llame al [phone].    We comply with applicable federal civil rights laws and [place] laws. We do not discriminate on the basis of race, color, national origin, age, disability, sex, sexual orientation, or gender identity.            Thank you!     [place] you [place] [facility] [place]. Our goal is always to provide you with excellent care. Hearing back from our patients is one way we can continue to improve our services. Please take a few minutes to complete the written survey that you may receive in the mail after your visit with us. Thank you!             Your Updated Medication List - Protect others around you: Learn how to safely use, store and throw away your medicines at www.disposemymeds.org.          This list is accurate as of 2/8/18  9:26 AM.  Always use your most recent med list.                   Brand Name Dispense Instructions for use Diagnosis    * albuterol (5 MG/ML) 0.5% neb solution    PROVENTIL    30 vial    Take 0.5 mLs (2.5 mg) by nebulization every 6 hours as needed    Mild persistent asthma without complication       * albuterol 108 (90 BASE) MCG/ACT Inhaler    PROAIR HFA/PROVENTIL HFA/VENTOLIN HFA    1 Inhaler    Inhale 2 puffs into the lungs every 6 hours as needed for shortness of breath / dyspnea or wheezing    Mild persistent  asthma without complication       ALPRAZolam 0.5 MG tablet    XANAX    30 tablet    Take 1 tablet (0.5 mg) by mouth 3 times daily as needed for anxiety    Anxiety       cetirizine 10 MG tablet    zyrTEC    90 tablet    Take 1 tablet (10 mg) by mouth every evening    Chronic allergic conjunctivitis       fluticasone 110 MCG/ACT Inhaler    FLOVENT HFA    3 Inhaler    Inhale 2 puffs into the lungs 2 times daily    Mild persistent asthma without complication, Seasonal allergic rhinitis, unspecified chronicity, unspecified trigger       fluticasone 50 MCG/ACT spray    FLONASE    16 g    Spray 2 sprays into both nostrils daily    Chronic allergic conjunctivitis       levonorgestrel 20 MCG/24HR IUD    MIRENA     1 each by Intrauterine route once 04/2012        olopatadine 0.1 % ophthalmic solution    PATANOL    1 Bottle    Place 1 drop into both eyes 2 times daily    Chronic allergic conjunctivitis       order for DME     1 Device    Nebulizer    Mild persistent asthma without complication       traZODone 50 MG tablet    DESYREL    30 tablet    Take 0.5-1 tablets (25-50 mg) by mouth At Bedtime    Primary insomnia       * Notice:  This list has 2 medication(s) that are the same as other medications prescribed for you. Read the directions carefully, and ask your doctor or other care provider to review them with you.

## 2018-02-09 LAB
C TRACH DNA SPEC QL NAA+PROBE: NEGATIVE
N GONORRHOEA DNA SPEC QL NAA+PROBE: NEGATIVE
SPECIMEN SOURCE: NORMAL
SPECIMEN SOURCE: NORMAL

## 2018-02-22 ENCOUNTER — OFFICE VISIT (OUTPATIENT)
Dept: OPTOMETRY | Facility: CLINIC | Age: 48
End: 2018-02-22
Payer: COMMERCIAL

## 2018-02-22 DIAGNOSIS — H52.13 HIGH MYOPIA, BILATERAL: Primary | ICD-10-CM

## 2018-02-22 DIAGNOSIS — H10.13 ALLERGIC CONJUNCTIVITIS, BILATERAL: ICD-10-CM

## 2018-02-22 DIAGNOSIS — H02.889 MEIBOMIAN GLAND DYSFUNCTION: ICD-10-CM

## 2018-02-22 PROCEDURE — 92310 CONTACT LENS FITTING OU: CPT | Performed by: OPTOMETRIST

## 2018-02-22 PROCEDURE — 92015 DETERMINE REFRACTIVE STATE: CPT | Performed by: OPTOMETRIST

## 2018-02-22 PROCEDURE — 92014 COMPRE OPH EXAM EST PT 1/>: CPT | Performed by: OPTOMETRIST

## 2018-02-22 ASSESSMENT — REFRACTION_MANIFEST
OD_SPHERE: -9.75
OS_SPHERE: -9.50
OD_CYLINDER: SPHERE
METHOD_AUTOREFRACTION: 1
OS_SPHERE: -9.25
OD_SPHERE: -9.25
OS_CYLINDER: SPHERE

## 2018-02-22 ASSESSMENT — CUP TO DISC RATIO
OS_RATIO: 0.3
OD_RATIO: 0.3

## 2018-02-22 ASSESSMENT — KERATOMETRY
OD_K2POWER_DIOPTERS: 48.25
OS_K2POWER_DIOPTERS: 48.37
OD_AXISANGLE_DEGREES: 103
OS_K1POWER_DIOPTERS: 47.12
OD_AXISANGLE2_DEGREES: 13
OD_K1POWER_DIOPTERS: 47.75
OS_AXISANGLE_DEGREES: 97
OS_AXISANGLE2_DEGREES: 7
METHOD_AUTO_MANUAL: AUTOMATED

## 2018-02-22 ASSESSMENT — VISUAL ACUITY
OS_CC: 20/20
OD_SC: 20/400
OS_CC: 20/40
METHOD: SNELLEN - LINEAR
OD_CC+: -1
OD_CC: 20/40
OS_SC: 20/500
OS_CC+: -1
OD_CC: 20/20
CORRECTION_TYPE: CONTACTS

## 2018-02-22 ASSESSMENT — CONF VISUAL FIELD
OS_NORMAL: 1
OD_NORMAL: 1

## 2018-02-22 ASSESSMENT — TONOMETRY
OS_IOP_MMHG: 15
OD_IOP_MMHG: 15
IOP_METHOD: APPLANATION

## 2018-02-22 ASSESSMENT — REFRACTION_CURRENTRX
OD_SPHERE: -8.00
OS_SPHERE: -8.00
OS_BRAND: J&J 1-DAY ACUVUE MOIST BC 8.5, D 14.2
OD_BRAND: J&J 1-DAY ACUVUE MOIST BC 8.5, D 14.2

## 2018-02-22 ASSESSMENT — EXTERNAL EXAM - RIGHT EYE: OD_EXAM: NORMAL

## 2018-02-22 ASSESSMENT — SLIT LAMP EXAM - LIDS
COMMENTS: NORMAL
COMMENTS: NORMAL

## 2018-02-22 ASSESSMENT — EXTERNAL EXAM - LEFT EYE: OS_EXAM: NORMAL

## 2018-02-22 NOTE — MR AVS SNAPSHOT
After Visit Summary   2/22/2018    Hannah Sung    MRN: [id number]           Patient Information     Date Of Birth          1970        Visit Information        Provider Department      2/22/2018 12:40 PM Sasha Irizarry, KAREEN [facility] [place]        Today's Diagnoses     High myopia, bilateral    -  1    Allergic conjunctivitis, bilateral        Meibomian gland dysfunction          Care Instructions    You can order your contact lenses online at [email or web address].  Click on services at the top of the page, then eye care and you will see the link to order contact lenses.  You can also order contact lenses at [phone]. There is no shipping fee if you order an annual supply otherwise  be sure to let them know which office you would like to  the lenses, [place] [phone].     no prescription change needed    Meibomian gland dysfunction or Posterior Blepharitis, is characterized by inflammation along both the uppper and lower eyelid margins. A single row of these glands is present in each lid with openings along the lid margins.  It is often found in association with skin conditions such as rosacea and seborrheic dermatitis.    Symptoms include:  ?Red eyes  ?Gritty or burning sensation  ?Burning sensation  ?Excessive tearing  ?Itchy eyelids  ?Red, swollen eyelids  ?Crusting or matting of eyelashes in the morning  ?Light sensitivity  ?Blurred vision    It is important to keep cosmetics from blocking these oil glands. If blocked, they do not   excrete oil into the tear film, which causes the tears to evaporate quickly.   This may result in watery eyes.  There is also an increase of bacterial growth when the tear film is unstable, leading to further ocular surface inflammation.    Warm compresses are very beneficial to the normal functioning of the eye.  Warm compresses for 5-10 minutes twice daily and keeping the lid margins clean  and help maintain a good tear film.   Moisten a  washcloth with hot water, or microwave for 10 seconds, being careful to not get the cloth too hot.   Then put the washcloth onto your eyelids for 5 minutes. It will cool quickly so a rice pack or eyemask that can be heated and laid on top of the washcloth will help retain the heat.    Omega 3 fatty acid supplements taken once to twice daily and artificial tears such as Soothe xp, Refresh optive , and systane balance are also an additional treatment to control inflammation and help soothe your eyes.     use patanol with lenses out, if allergies are bad use two times daily consistently am and pm, and the effect will be best after 2 weeks          Follow-ups after your visit        Follow-up notes from your care team     Return in about 1 year (around 2/22/2019) for Annual Visit.      Who to contact     If you have questions or need follow up information about today's clinic visit or your schedule please contact [facility] directly at [phone].  Normal or non-critical lab and imaging results will be communicated to you by ConXtechhart, letter or phone within 4 business days after the clinic has received the results. If you do not hear from us within 7 days, please contact the clinic through Telematik or phone. If you have a critical or abnormal lab result, we will notify you by phone as soon as possible.  Submit refill requests through Telematik or call your pharmacy and they will forward the refill request to us. Please allow 3 business days for your refill to be completed.          Additional Information About Your Visit        [employer] Information     Tele[employer]ik gives you secure access to your electronic health record. If you see a primary care provider, you can also send messages to your care team and make appointments. If you have questions, please call your primary care clinic.  If you do not have a primary care provider, please call [phone] and they will assist you.        Care EveryWhere ID     This is  your Care EveryWhere ID. This could be used by other organizations to access your [place] medical records  [id number]        Your Vitals Were     Last Period                   02/01/2018            Blood Pressure from Last 3 Encounters:   02/08/18 125/80   12/15/17 118/70   08/09/17 102/74    Weight from Last 3 Encounters:   02/08/18 77.1 kg (170 lb)   12/15/17 77.1 kg (170 lb)   01/10/17 79.8 kg (176 lb)              We Performed the Following     CONTACT LENS FITTING,BILAT     EYE EXAM (SIMPLE-NONBILLABLE)     REFRACTION        Primary Care Provider Fax #    Provider Not In System [phone]                Equal Access to Services     VERA JORDAN : Hadten Camara, master clancy, marley donato, mandi maddox . So [facility] [phone].    ATENCIÓN: Si habla español, tiene a hurtado disposición servicios gratuitos de asistencia lingüística. Llame al [phone].    We comply with applicable federal civil rights laws and [place] laws. We do not discriminate on the basis of race, color, national origin, age, disability, sex, sexual orientation, or gender identity.            Thank you!     Thank you for choosing [facility]  for your care. Our goal is always to provide you with excellent care. Hearing back from our patients is one way we can continue to improve our services. Please take a few minutes to complete the written survey that you may receive in the mail after your visit with us. Thank you!             Your Updated Medication List - Protect others around you: Learn how to safely use, store and throw away your medicines at www.disposemymeds.org.          This list is accurate as of 2/22/18  2:04 PM.  Always use your most recent med list.                   Brand Name Dispense Instructions for use Diagnosis    * albuterol (5 MG/ML) 0.5% neb solution    PROVENTIL    30 vial    Take 0.5 mLs (2.5 mg) by nebulization every 6 hours as needed    Mild  persistent asthma without complication       * albuterol 108 (90 BASE) MCG/ACT Inhaler    PROAIR HFA/PROVENTIL HFA/VENTOLIN HFA    1 Inhaler    Inhale 2 puffs into the lungs every 6 hours as needed for shortness of breath / dyspnea or wheezing    Mild persistent asthma without complication       ALPRAZolam 0.5 MG tablet    XANAX    30 tablet    Take 1 tablet (0.5 mg) by mouth 3 times daily as needed for anxiety    Anxiety       cetirizine 10 MG tablet    zyrTEC    90 tablet    Take 1 tablet (10 mg) by mouth every evening    Chronic allergic conjunctivitis       fluticasone 110 MCG/ACT Inhaler    FLOVENT HFA    3 Inhaler    Inhale 2 puffs into the lungs 2 times daily    Mild persistent asthma without complication, Seasonal allergic rhinitis, unspecified chronicity, unspecified trigger       fluticasone 50 MCG/ACT spray    FLONASE    16 g    Spray 2 sprays into both nostrils daily    Chronic allergic conjunctivitis       levonorgestrel 20 MCG/24HR IUD    MIRENA     1 each by Intrauterine route once 04/2012        olopatadine 0.1 % ophthalmic solution    PATANOL    1 Bottle    Place 1 drop into both eyes 2 times daily    Chronic allergic conjunctivitis       order for DME     1 Device    Nebulizer    Mild persistent asthma without complication       traZODone 50 MG tablet    DESYREL    30 tablet    Take 0.5-1 tablets (25-50 mg) by mouth At Bedtime    Primary insomnia       * Notice:  This list has 2 medication(s) that are the same as other medications prescribed for you. Read the directions carefully, and ask your doctor or other care provider to review them with you.

## 2018-02-22 NOTE — PATIENT INSTRUCTIONS
You can order your contact lenses online at [email or web address].  Click on services at the top of the page, then eye care and you will see the link to order contact lenses.  You can also order contact lenses at [phone]. There is no shipping fee if you order an annual supply otherwise  be sure to let them know which office you would like to  the lenses, [place] [phone].     no prescription change needed    Meibomian gland dysfunction or Posterior Blepharitis, is characterized by inflammation along both the uppper and lower eyelid margins. A single row of these glands is present in each lid with openings along the lid margins.  It is often found in association with skin conditions such as rosacea and seborrheic dermatitis.    Symptoms include:  ?Red eyes  ?Gritty or burning sensation  ?Burning sensation  ?Excessive tearing  ?Itchy eyelids  ?Red, swollen eyelids  ?Crusting or matting of eyelashes in the morning  ?Light sensitivity  ?Blurred vision    It is important to keep cosmetics from blocking these oil glands. If blocked, they do not   excrete oil into the tear film, which causes the tears to evaporate quickly.   This may result in watery eyes.  There is also an increase of bacterial growth when the tear film is unstable, leading to further ocular surface inflammation.    Warm compresses are very beneficial to the normal functioning of the eye.  Warm compresses for 5-10 minutes twice daily and keeping the lid margins clean  and help maintain a good tear film.   Moisten a washcloth with hot water, or microwave for 10 seconds, being careful to not get the cloth too hot.   Then put the washcloth onto your eyelids for 5 minutes. It will cool quickly so a rice pack or eyemask that can be heated and laid on top of the washcloth will help retain the heat.    Omega 3 fatty acid supplements taken once to twice daily and artificial tears such as Soothe xp, Refresh optive , and systane balance are also an  additional treatment to control inflammation and help soothe your eyes.     use patanol with lenses out, if allergies are bad use two times daily consistently am and pm, and the effect will be best after 2 weeks

## 2018-02-22 NOTE — PROGRESS NOTES
Chief Complaint   Patient presents with     COMPREHENSIVE EYE EXAM     Contact Lens Evaluation   uses pataday as needed for itching   Almost out of lenses   Has had iritis in R eye x 3     Previous contact lens wearer? Yes: Acuvue 1-day moist  Comfort of contact lenses :Good  Satisfied with current lenses: Yes        Last Eye Exam: 2yrs  Dilated Previously: Yes    What are you currently using to see?  contacts    Distance Vision Acuity: Satisfied with vision    Near Vision Acuity: Satisfied with vision while reading  unaided    Eye Comfort: itchy  Do you use eye drops? 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       OBJECTIVE: See Ophthalmology exam    ASSESSMENT:    ICD-10-CM    1. High myopia, bilateral H52.13 EYE EXAM (SIMPLE-NONBILLABLE)     REFRACTION     CONTACT LENS FITTING,BILAT   2. Allergic conjunctivitis, bilateral H10.13    3. Meibomian gland dysfunction H02.89       PLAN:   No prescription change, updated prescription for contacts and glasses   Continue with patanol as needed   Renew prescription for contacts, and glasses      Sasha Irizarry OD

## 2018-02-22 NOTE — LETTER
2/22/2018         RE: Hannah Sung  [address]   [facility] [address]        Dear Colleague,    Thank you for referring your patient, Hannah Sung, to the [facility]. Please see a copy of my visit note below.    Chief Complaint   Patient presents with     COMPREHENSIVE EYE EXAM     Contact Lens Evaluation   uses pataday as needed for itching   Almost out of lenses   Has had iritis in R eye x 3     Previous contact lens wearer? Yes: Acuvue 1-day moist  Comfort of contact lenses :Good  Satisfied with current lenses: Yes        Last Eye Exam: 2yrs  Dilated Previously: Yes    What are you currently using to see?  contacts    Distance Vision Acuity: Satisfied with vision    Near Vision Acuity: Satisfied with vision while reading  unaided    Eye Comfort: itchy  Do you use eye drops? : Yes: Patanol  Occupation or Hobbies: [employer]ook    Works at [employer] in [employer] , does the [employer]        Medical, surgical and family histories reviewed and updated 2/22/2018.       OBJECTIVE: See Ophthalmology exam    ASSESSMENT:    ICD-10-CM    1. High myopia, bilateral H52.13 EYE EXAM (SIMPLE-NONBILLABLE)     REFRACTION     CONTACT LENS FITTING,BILAT   2. Allergic conjunctivitis, bilateral H10.13    3. Meibomian gland dysfunction H02.89       PLAN:   No prescription change, updated prescription for contacts and glasses   Continue with patanol as needed   Renew prescription for contacts, and glasses      Sasha Irizarry OD                     Again, thank you for allowing me to participate in the care of your patient.        Sincerely,        Sasha Irizarry, OD

## 2018-04-12 ENCOUNTER — TELEPHONE (OUTPATIENT)
Dept: FAMILY MEDICINE | Facility: CLINIC | Age: 48
End: 2018-04-12

## 2018-04-12 ENCOUNTER — MYC MEDICAL ADVICE (OUTPATIENT)
Dept: FAMILY MEDICINE | Facility: CLINIC | Age: 48
End: 2018-04-12

## 2018-04-12 NOTE — TELEPHONE ENCOUNTER
Reason for Call:  Other call back    Detailed comments: Patient and [employer] Employee calling in wanting to know where Dr. Amor is as she wants to see her in her dermatology practice.   Please call patient back to discuss.    Phone Number Patient can be reached at: Other phone number:  [phone]    Best Time: anytime    Can we leave a detailed message on this number? YES    Call taken on 4/12/2018 at 2:34 PM by Simi Lima

## 2018-04-25 DIAGNOSIS — J45.30 MILD PERSISTENT ASTHMA WITHOUT COMPLICATION: ICD-10-CM

## 2018-04-25 NOTE — TELEPHONE ENCOUNTER
"Requested Prescriptions   Pending Prescriptions Disp Refills     albuterol (PROAIR HFA/PROVENTIL HFA/VENTOLIN HFA) 108 (90 Base) MCG/ACT Inhaler  Last Written Prescription Date:  12/15/2017  Last Fill Quantity: 1 inhaler,  # refills: 0   Last office visit: 12/15/2017 with prescribing provider:  NADER Simmons   Future Office Visit:     1 Inhaler 0     Sig: Inhale 2 puffs into the lungs every 6 hours as needed for shortness of breath / dyspnea or wheezing    Asthma Maintenance Inhalers - Anticholinergics Passed    4/25/2018  3:44 PM       Passed - Patient is age 12 years or older       Passed - Asthma control assessment score within normal limits in last 6 months    Please review ACT score.   ACT Total Scores 7/31/2017 8/9/2017 12/15/2017   ACT TOTAL SCORE - - -   ASTHMA ER VISITS - - -   ASTHMA HOSPITALIZATIONS - - -   ACT TOTAL SCORE (Goal Greater than or Equal to 20) 22 19 20   In the past 12 months, how many times did you visit the emergency room for your asthma without being admitted to the hospital? 0 0 0   In the past 12 months, how many times were you hospitalized overnight because of your asthma? 0 0 0          Passed - Recent (6 mo) or future (30 days) visit within the authorizing provider's specialty    Patient had office visit in the last 6 months or has a visit in the next 30 days with authorizing provider or within the authorizing provider's specialty.  See \"Patient Info\" tab in inbasket, or \"Choose Columns\" in Meds & Orders section of the refill encounter.              "

## 2018-04-26 RX ORDER — ALBUTEROL SULFATE 90 UG/1
2 AEROSOL, METERED RESPIRATORY (INHALATION) EVERY 6 HOURS PRN
Qty: 1 INHALER | Refills: 0 | Status: SHIPPED | OUTPATIENT
Start: 2018-04-26 | End: 2018-09-13

## 2018-06-24 ENCOUNTER — HEALTH MAINTENANCE LETTER (OUTPATIENT)
Age: 48
End: 2018-06-24

## 2018-06-26 ENCOUNTER — OFFICE VISIT (OUTPATIENT)
Dept: FAMILY MEDICINE | Facility: CLINIC | Age: 48
End: 2018-06-26
Payer: COMMERCIAL

## 2018-06-26 VITALS
HEIGHT: 64 IN | DIASTOLIC BLOOD PRESSURE: 86 MMHG | WEIGHT: 186.6 LBS | SYSTOLIC BLOOD PRESSURE: 132 MMHG | OXYGEN SATURATION: 98 % | TEMPERATURE: 97.6 F | HEART RATE: 74 BPM | BODY MASS INDEX: 31.86 KG/M2

## 2018-06-26 DIAGNOSIS — L30.9 DERMATITIS: Primary | ICD-10-CM

## 2018-06-26 DIAGNOSIS — J45.20 MILD INTERMITTENT ASTHMA WITHOUT COMPLICATION: ICD-10-CM

## 2018-06-26 PROCEDURE — 99214 OFFICE O/P EST MOD 30 MIN: CPT | Performed by: PREVENTIVE MEDICINE

## 2018-06-26 RX ORDER — HYDROXYZINE HYDROCHLORIDE 25 MG/1
25-50 TABLET, FILM COATED ORAL EVERY 6 HOURS PRN
Qty: 60 TABLET | Refills: 1 | Status: SHIPPED | OUTPATIENT
Start: 2018-06-26 | End: 2018-07-13

## 2018-06-26 RX ORDER — TRIAMCINOLONE ACETONIDE 5 MG/G
CREAM TOPICAL
Qty: 30 G | Refills: 0 | Status: SHIPPED | OUTPATIENT
Start: 2018-06-26

## 2018-06-26 RX ORDER — METHYLPREDNISOLONE 4 MG
TABLET ORAL
Qty: 21 TABLET | Refills: 0 | Status: SHIPPED | OUTPATIENT
Start: 2018-06-26 | End: 2018-07-13

## 2018-06-26 ASSESSMENT — PAIN SCALES - GENERAL: PAINLEVEL: EXTREME PAIN (8)

## 2018-06-26 NOTE — PROGRESS NOTES
SUBJECTIVE:   Hannah Sung is a 47 year old female who presents to clinic today for the following health issues:    Rash  Onset:  1 weeks: pt states she was running and had to stop in the bushes to urinate. Pt states she might have urinated in poison ivy     Description:   Location: bottom, lower abdomen and groin.   Character: blotchy, raised, red  Itching (Pruritis): no, painful     Progression of Symptoms:  worsening    Accompanying Signs & Symptoms:  Fever: no   Body aches or joint pain: no   Sore throat symptoms: no   Recent cold symptoms: no     History:   Previous similar rash: no     Precipitating factors:   Exposure to similar rash: no   New exposures: something in the woods      Alleviating factors:  None     Therapies Tried and outcome: Pt has been taking benadryl, calamine, old rx of cream, and 1 pill prednisone   No fever  Increased yesterday since wearing pants  No lip swelling  No bronchoconstriction     Asthma Follow-Up    Was ACT completed today?    Yes    ACT Total Scores 6/26/2018   ACT TOTAL SCORE -   ASTHMA ER VISITS -   ASTHMA HOSPITALIZATIONS -   ACT TOTAL SCORE (Goal Greater than or Equal to 20) 20   In the past 12 months, how many times did you visit the emergency room for your asthma without being admitted to the hospital? 0   In the past 12 months, how many times were you hospitalized overnight because of your asthma? 0       Recent asthma triggers that patient is dealing with: None        Problem list and histories reviewed & adjusted, as indicated.  Additional history: as documented    Patient Active Problem List   Diagnosis     CARDIOVASCULAR SCREENING; LDL GOAL LESS THAN 160     Mild intermittent asthma     Seasonal allergic rhinitis     Generalized anxiety disorder     Primary insomnia     History reviewed. No pertinent surgical history.    Social History   Substance Use Topics     Smoking status: Never Smoker     Smokeless tobacco: Never Used     Alcohol use Yes      Comment: wine  occasionally     Family History   Problem Relation Age of Onset     Cancer Mother      Skin - basal      Cancer Maternal Grandfather      Multiple Myeloma/melanoma     Glaucoma No family hx of      Macular Degeneration No family hx of      Diabetes No family hx of      Hypertension No family hx of          Current Outpatient Prescriptions   Medication Sig Dispense Refill     albuterol (PROAIR HFA/PROVENTIL HFA/VENTOLIN HFA) 108 (90 Base) MCG/ACT Inhaler Inhale 2 puffs into the lungs every 6 hours as needed for shortness of breath / dyspnea or wheezing 1 Inhaler 0     albuterol (PROVENTIL) (5 MG/ML) 0.5% neb solution Take 0.5 mLs (2.5 mg) by nebulization every 6 hours as needed 30 vial 3     ALPRAZolam (XANAX) 0.5 MG tablet Take 1 tablet (0.5 mg) by mouth 3 times daily as needed for anxiety 30 tablet 1     cetirizine (ZYRTEC) 10 MG tablet Take 1 tablet (10 mg) by mouth every evening (Patient taking differently: Take 10 mg by mouth as needed ) 90 tablet 3     fluticasone (FLONASE) 50 MCG/ACT spray Spray 2 sprays into both nostrils daily 16 g 11     fluticasone (FLOVENT HFA) 110 MCG/ACT Inhaler Inhale 2 puffs into the lungs 2 times daily 3 Inhaler 3     hydrOXYzine (ATARAX) 25 MG tablet Take 1-2 tablets (25-50 mg) by mouth every 6 hours as needed for itching 60 tablet 1     levonorgestrel (MIRENA) 20 MCG/24HR IUD 1 each by Intrauterine route once 04/2012       methylPREDNISolone (MEDROL) 4 MG tablet follow package directions 21 tablet 0     olopatadine (PATANOL) 0.1 % ophthalmic solution Place 1 drop into both eyes 2 times daily 1 Bottle 12     order for DME Nebulizer 1 Device 0     traZODone (DESYREL) 50 MG tablet Take 0.5-1 tablets (25-50 mg) by mouth At Bedtime 30 tablet 1     triamcinolone (KENALOG) 0.5 % cream Apply sparingly to affected area three times daily. 30 g 0     No Known Allergies  BP Readings from Last 3 Encounters:   06/26/18 132/86   02/08/18 125/80   12/15/17 118/70    Wt Readings from Last 3  "Encounters:   06/26/18 186 lb 9.6 oz (84.6 kg)   02/08/18 170 lb (77.1 kg)   12/15/17 170 lb (77.1 kg)                  Labs reviewed in EPIC    Reviewed and updated as needed this visit by clinical staff  Allergies  Meds       Reviewed and updated as needed this visit by Provider         ROS:  Constitutional, HEENT, cardiovascular, pulmonary, gi and gu systems are negative, except as otherwise noted.    OBJECTIVE:                                                    /86 (BP Location: Left arm, Patient Position: Sitting, Cuff Size: Adult Regular)  Pulse 74  Temp 97.6  F (36.4  C) (Oral)  Ht 5' 4.17\" (1.63 m)  Wt 186 lb 9.6 oz (84.6 kg)  SpO2 98%  BMI 31.86 kg/m2  Body mass index is 31.86 kg/(m^2).  GENERAL APPEARANCE: healthy, alert and no distress  EYES: Eyes grossly normal to inspection and conjunctivae and sclerae normal  NECK: trachea midline and normal to palpation  RESP: lungs clear to auscultation - no rales, rhonchi or wheezes  CV: regular rates and rhythm, normal S1 S2, no S3 or S4 and no murmur, click or rub  ABDOMEN: no rebound or guarding   MS: extremities normal- no gross deformities noted  SKIN: Erythematous patch on buttocks, 6 X 3 inches in size, no blisters, no drainage, tender+, erythematous patches on the left side of the lower abdomen.   NEURO: Normal strength and tone, mentation intact and speech normal  PSYCH: mentation appears normal    Diagnostic test results:  Diagnostic Test Results:  No results found for this or any previous visit (from the past 24 hour(s)).     ASSESSMENT/PLAN:                                                    1. Dermatitis  -Possible contact with poison ivy  -Cold compresses  - methylPREDNISolone (MEDROL) 4 MG tablet; follow package directions  Dispense: 21 tablet; Refill: 0  - triamcinolone (KENALOG) 0.5 % cream; Apply sparingly to affected area three times daily.  Dispense: 30 g; Refill: 0  - hydrOXYzine (ATARAX) 25 MG tablet; Take 1-2 tablets (25-50 mg) " by mouth every 6 hours as needed for itching  Dispense: 60 tablet; Refill: 1,sedation side effect reviewed     2. Mild intermittent asthma without complication  -ACT at goal      Follow up with Provider - if not better in one week  Has been scheduled for a physical 7/11/18     Babs Solano MD MPH    [facility]

## 2018-06-26 NOTE — MR AVS SNAPSHOT
After Visit Summary   6/26/2018    Hannah Sung    MRN: [id number]           Patient Information     Date Of Birth          1970        Visit Information        Provider Department      6/26/2018 2:40 PM Babs Solano MD [facility]        Today's Diagnoses     Dermatitis    -  1    Mild intermittent asthma without complication          Care Instructions    At [facility], we strive to deliver an exceptional experience to you, every time we see you.  If you receive a survey in the mail, please send us back your thoughts. We really do value your feedback.    Based on your medical history, these are the current health maintenance/preventive care services that you are due for (some may have been done at this visit.)  Health Maintenance Due   Topic Date Due     HIV SCREEN (SYSTEM ASSIGNED)  07/01/1986     ASTHMA ACTION PLAN Q1 YR  04/08/2016     PHQ-2 Q1 YR  01/10/2018     PAP SCREENING Q3 YR (SYSTEM ASSIGNED)  04/21/2018     ASTHMA CONTROL TEST Q6 MOS  06/15/2018       Suggested websites for health information:  Www.SaludFÃCIL.HerBabyShower : Up to date and easily searchable information on multiple topics.  Www.medlineplus.gov : medication info, interactive tutorials, watch real surgeries online  Www.familydoctor.org : good info from the Academy of Family Physicians  Www.cdc.gov : public health info, travel advisories, epidemics (H1N1)  Www.aap.org : children's health info, normal development, vaccinations  Www.health.state[place] : [place] dept of health, public health issues in [place], N1N1    Your care team:                            Family Medicine Internal Medicine   MD Kendall Medina MD Shantel Branch-Fleming, MD Katya Georgiev PA-C Megan Hill, APRN CNP    Ventura Queen MD Pediatrics   DORY Serrano, MD Elda Bourgeois APRN CNP   MD Mireille Oneil MD Deborah Mielke, MD Kim Thein, APRN CNP       Clinic hours: Monday - Thursday 7 am-7 pm; Fridays 7 am-5 pm.   Urgent care: Monday - Friday 11 am-9 pm; Saturday and Sunday 9 am-5 pm.  Pharmacy : Monday -Thursday 8 am-8 pm; Friday 8 am-6 pm; Saturday and Sunday 9 am-5 pm.     Clinic: [phone]   Pharmacy: [phone]              Follow-ups after your visit        Follow-up notes from your care team     Return in about 1 week (around 7/3/2018), or if symptoms worsen or fail to improve.      Your next 10 appointments already scheduled     Jul 11, 2018  9:00 AM CDT   PHYSICAL with Babs Solano MD   [facility]    [address] [place] [id number] [phone]              Who to contact     If you have questions or need follow up information about today's clinic visit or your schedule please contact [facility] directly at [phone].  Normal or non-critical lab and imaging results will be communicated to you by MyChart, letter or phone within 4 business days after the clinic has received the results. If you do not hear from us within 7 days, please contact the clinic through [facility] or phone. If you have a critical or abnormal lab result, we will notify you by phone as soon as possible.  Submit refill requests through [facility] or call your pharmacy and they will forward the refill request to us. Please allow 3 business days for your refill to be completed.          Additional Information About Your Visit        [facility] Information     [facility] gives you secure access to your electronic health record. If you see a primary care provider, you can also send messages to your care team and make appointments. If you have questions, please call your primary care clinic.  If you do not have a primary care provider, please call [phone] and they will assist you.        Care EveryWhere ID     This is your Care EveryWhere ID. This could be used by other organizations  "to access your [facility] medical records  [id number]        Your Vitals Were     Pulse Temperature Height Pulse Oximetry BMI (Body Mass Index)       74 97.6  F (36.4  C) (Oral) 5' 4.17\" (1.63 m) 98% 31.86 kg/m2        Blood Pressure from Last 3 Encounters:   06/26/18 132/86   02/08/18 125/80   12/15/17 118/70    Weight from Last 3 Encounters:   06/26/18 186 lb 9.6 oz (84.6 kg)   02/08/18 170 lb (77.1 kg)   12/15/17 170 lb (77.1 kg)              Today, you had the following     No orders found for display         Today's Medication Changes          These changes are accurate as of 6/26/18  3:32 PM.  If you have any questions, ask your nurse or doctor.               Start taking these medicines.        Dose/Directions    hydrOXYzine 25 MG tablet   Commonly known as:  ATARAX   Used for:  Dermatitis   Started by:  Babs Solano MD        Dose:  25-50 mg   Take 1-2 tablets (25-50 mg) by mouth every 6 hours as needed for itching   Quantity:  60 tablet   Refills:  1       methylPREDNISolone 4 MG tablet   Commonly known as:  MEDROL   Used for:  Dermatitis   Started by:  Babs Solano MD        follow package directions   Quantity:  21 tablet   Refills:  0       triamcinolone 0.5 % cream   Commonly known as:  KENALOG   Used for:  Dermatitis   Started by:  Babs Solano MD        Apply sparingly to affected area three times daily.   Quantity:  30 g   Refills:  0         These medicines have changed or have updated prescriptions.        Dose/Directions    cetirizine 10 MG tablet   Commonly known as:  zyrTEC   This may have changed:    - when to take this  - reasons to take this   Used for:  Chronic allergic conjunctivitis        Dose:  10 mg   Take 1 tablet (10 mg) by mouth every evening   Quantity:  90 tablet   Refills:  3            Where to get your medicines      These medications [place] [facility] [place] - [address]  [address], [place] [address]     Phone:  " [phone]     hydrOXYzine 25 MG tablet    triamcinolone 0.5 % cream         Some of these will need a paper prescription and others can be bought over the counter.  Ask your nurse if you have questions.     Bring a paper prescription for each of these medications     methylPREDNISolone 4 MG tablet                Primary Care Provider Fax #    Physician No Ref-Primary [phone]       No address on file        Equal Access to Services     [facility] : Hadii aad ku hadasho Soomaali, waaxda luqadaha, qaybta kaalmada adeegyada, waxay raysain yandeln adematias miller varsha . So [facility] [phone].    ATENCIÓN: Si habla español, tiene a hurtado disposición servicios gratuitos de asistencia lingüística. Melissa al [phone].    We comply with applicable federal civil rights laws and [place] laws. We do not discriminate on the basis of race, color, national origin, age, disability, sex, sexual orientation, or gender identity.            Thank you!     Thank you for choosing [facility]  for your care. Our goal is always to provide you with excellent care. Hearing back from our patients is one way we can continue to improve our services. Please take a few minutes to complete the written survey that you may receive in the mail after your visit with us. Thank you!             Your Updated Medication List - Protect others around you: Learn how to safely use, store and throw away your medicines at www.disposemymeds.org.          This list is accurate as of 6/26/18  3:32 PM.  Always use your most recent med list.                   Brand Name Dispense Instructions for use Diagnosis    * albuterol (5 MG/ML) 0.5% neb solution    PROVENTIL    30 vial    Take 0.5 mLs (2.5 mg) by nebulization every 6 hours as needed    Mild persistent asthma without complication       * albuterol 108 (90 Base) MCG/ACT Inhaler    PROAIR HFA/PROVENTIL HFA/VENTOLIN HFA    1 Inhaler    Inhale 2 puffs into the lungs every 6 hours as  needed for shortness of breath / dyspnea or wheezing    Mild persistent asthma without complication       ALPRAZolam 0.5 MG tablet    XANAX    30 tablet    Take 1 tablet (0.5 mg) by mouth 3 times daily as needed for anxiety    Anxiety       cetirizine 10 MG tablet    zyrTEC    90 tablet    Take 1 tablet (10 mg) by mouth every evening    Chronic allergic conjunctivitis       fluticasone 110 MCG/ACT Inhaler    FLOVENT HFA    3 Inhaler    Inhale 2 puffs into the lungs 2 times daily    Mild persistent asthma without complication, Seasonal allergic rhinitis, unspecified chronicity, unspecified trigger       fluticasone 50 MCG/ACT spray    FLONASE    16 g    Spray 2 sprays into both nostrils daily    Chronic allergic conjunctivitis       hydrOXYzine 25 MG tablet    ATARAX    60 tablet    Take 1-2 tablets (25-50 mg) by mouth every 6 hours as needed for itching    Dermatitis       levonorgestrel 20 MCG/24HR IUD    MIRENA     1 each by Intrauterine route once 04/2012        methylPREDNISolone 4 MG tablet    MEDROL    21 tablet    follow package directions    Dermatitis       olopatadine 0.1 % ophthalmic solution    PATANOL    1 Bottle    Place 1 drop into both eyes 2 times daily    Chronic allergic conjunctivitis       order for DME     1 Device    Nebulizer    Mild persistent asthma without complication       traZODone 50 MG tablet    DESYREL    30 tablet    Take 0.5-1 tablets (25-50 mg) by mouth At Bedtime    Primary insomnia       triamcinolone 0.5 % cream    KENALOG    30 g    Apply sparingly to affected area three times daily.    Dermatitis       * Notice:  This list has 2 medication(s) that are the same as other medications prescribed for you. Read the directions carefully, and ask your doctor or other care provider to review them with you.

## 2018-06-26 NOTE — PATIENT INSTRUCTIONS
At [facility], we strive to deliver an exceptional experience to you, every time we see you.  If you receive a survey in the mail, please send us back your thoughts. We really do value your feedback.    Based on your medical history, these are the current health maintenance/preventive care services that you are due for (some may have been done at this visit.)  Health Maintenance Due   Topic Date Due     HIV SCREEN (SYSTEM ASSIGNED)  07/01/1986     ASTHMA ACTION PLAN Q1 YR  04/08/2016     PHQ-2 Q1 YR  01/10/2018     PAP SCREENING Q3 YR (SYSTEM ASSIGNED)  04/21/2018     ASTHMA CONTROL TEST Q6 MOS  06/15/2018       Suggested websites for health information:  Www.nth Solutions.Mesosphere : Up to date and easily searchable information on multiple topics.  Www.FUJIAN HAIYUAN.gov : medication info, interactive tutorials, watch real surgeries online  Www.familydoctor.org : good info from the Academy of Family Physicians  Www.cdc.gov : public health info, travel advisories, epidemics (H1N1)  Www.aap.org : children's health info, normal development, vaccinations  Www.health.[facility].[place].us : [place] dept of health, public health issues in [place], N1N1    Your care team:                            Family Medicine Internal Medicine   MD Kendall Medina MD Shantel Branch-Fleming, MD Katya Georgiev PA-C Megan Hill, APRSHANEKA Queen MD Pediatrics   Dave Jung, DORY Smalls, MD Elda Bourgeois APRN CNP   MD Mireille Oneil MD Deborah Mielke, MD Kim Thein, APRN [facility]      Clinic hours: Monday - Thursday 7 am-7 pm; Fridays 7 am-5 pm.   Urgent care: Monday - Friday 11 am-9 pm; Saturday and Sunday 9 am-5 pm.  Pharmacy : Monday -Thursday 8 am-8 pm; Friday 8 am-6 pm; Saturday and Sunday 9 am-5 pm.     Clinic: [phone]   Pharmacy: [phone]

## 2018-06-27 ASSESSMENT — ASTHMA QUESTIONNAIRES: ACT_TOTALSCORE: 20

## 2018-07-06 ENCOUNTER — CARE COORDINATION (OUTPATIENT)
Dept: SURGERY | Facility: CLINIC | Age: 48
End: 2018-07-06

## 2018-07-11 ENCOUNTER — TELEPHONE (OUTPATIENT)
Dept: FAMILY MEDICINE | Facility: CLINIC | Age: 48
End: 2018-07-11

## 2018-07-11 NOTE — TELEPHONE ENCOUNTER
Spoke to patient she has some anxiety taking test she talked with a co worker and they have similar issue and told her to ask her provider to prescribe something short term. She is wanting to do a phone visit to discuss if this is a possibility.  Concepcion Cr,Clinic Rn  [facility]

## 2018-07-11 NOTE — TELEPHONE ENCOUNTER
Reason for Call:  Other / Same day appointment request    Detailed comments: Patient called and stated she has a procedure coming up in a week and would like to discuss medication she needs to take with her doctor.  Patient also stated that it would be a short 5 to 10 minute conversation.  Patient also requested to get a call back to see if she can be place in one of the same day appointment slots.  Please call patient back to discuss.    Phone Number Patient can be reached at: Cell number on file:    Telephone Information:   Mobile [phone]       Best Time: Anytime    Can we leave a detailed message on this number? YES    Call taken on 7/11/2018 at 2:25 PM by Pauline Peterson

## 2018-07-13 ENCOUNTER — VIRTUAL VISIT (OUTPATIENT)
Dept: FAMILY MEDICINE | Facility: CLINIC | Age: 48
End: 2018-07-13
Payer: COMMERCIAL

## 2018-07-13 DIAGNOSIS — F41.8 TEST ANXIETY: Primary | ICD-10-CM

## 2018-07-13 PROCEDURE — 99441 ZZC PHYSICIAN TELEPHONE EVALUATION 5-10 MIN: CPT | Performed by: PHYSICIAN ASSISTANT

## 2018-07-13 RX ORDER — PROPRANOLOL HYDROCHLORIDE 20 MG/1
20 TABLET ORAL 2 TIMES DAILY
Qty: 30 TABLET | Refills: 0 | Status: SHIPPED | OUTPATIENT
Start: 2018-07-13 | End: 2019-07-12

## 2018-07-13 NOTE — PROGRESS NOTES
"Hannah Sung is a 48 year old female who is being evaluated via a telephone visit.      The patient has been notified of following:     \"This telephone visit will be conducted via a call between you and your physician/provider. We have found that certain health care needs can be provided without the need for a physical exam.  This service lets us provide the care you need with a short phone conversation.  If a prescription is necessary we can send it directly to your pharmacy.  If lab work is needed we can place an order for that and you can then stop by our lab to have the test done at a later time.    We will bill your insurance company for this service.  Please check with your medical insurance if this type of visit is covered. You may be responsible for the cost of this type of visit if insurance coverage is denied.  The typical cost is $30 (10min), $59 (11-20min) and $85 (21-30min).  Most often these visits are shorter than 10 minutes.    If during the course of the call the physician/provider feels a telephone visit is not appropriate, you will not be charged for this service.\"     Consent has been obtained for this service by care team member: yes.   See the scanned image in the medical record.    Hannah Sung complains of  Medication Request (Something to help with test anxiety-propranolol?)      I have reviewed and updated the patient's Past Medical History, Social History, Family History and Medication List.    ALLERGIES  Review of patient's allergies indicates no known allergies.    Virginia Meyer CMA   (MA signature)    Additional provider notes:  Hannah scheduled a phone visit today to discuss possible medication for test anxiety.  She has an upcoming exam next week.  She has requested extra time for this exam but the rules are strict and this is not a possibility.   She notes getting really anxious, and then have problems with increased heart rate, shaking, poor concentration, anxiety which affects her " ability to take the test. She feels she knows the material really well.      Assessment/Plan:  1. Test anxiety  Below medication as directed with full discussion of risks, benefits, and possible adverse effects.  RTC if symptoms worsen or do not continue to improve as anticipated.  Pt understood and agreed with plan.  - propranolol (INDERAL) 20 MG tablet; Take 1 tablet (20 mg) by mouth 2 times daily  Dispense: 30 tablet; Refill: 0    I have reviewed the note as documented above.  This accurately captures the substance of my conversation with the patient,    Total time of call between patient and provider was 5 minutes

## 2018-07-13 NOTE — MR AVS SNAPSHOT
After Visit Summary   7/13/2018    Hannah Sung    MRN: [id number]           Patient Information     Date Of Birth          1970        Visit Information        Provider Department      7/13/2018 9:20 AM Sarah Simmons PA-C [facility]        Today's Diagnoses     Test anxiety    -  1       Follow-ups after your visit        Your next 10 appointments already scheduled     Aug 13, 2018  5:40 PM CDT   PHYSICAL with Sarah Simmons PA-C   [facility]    [address] [place] [id number] [phone]              Who to contact     If you have questions or need follow up information about today's clinic visit or your schedule please contact [facility] directly at [phone].  Normal or non-critical lab and imaging results will be communicated to you by MyChart, letter or phone within 4 business days after the clinic has received the results. If you do not hear from us within 7 days, please contact the clinic through [employer] [facility] or phone. If you have a critical or abnormal lab result, we will notify you by phone as soon as possible.  Submit refill requests through [employer] or call your pharmacy and they will forward the refill request to us. Please allow 3 business days for your refill to be completed.          Additional Information About Your Visit        MyChart Information     [employer] gives you secure access to your electronic health record. If you see a primary care provider, you can also send messages to your care team and make appointments. If you have questions, please call your primary care clinic.  If you do not have a primary care provider, please call [phone] and they will assist you.        Care EveryWhere ID     This is your Care EveryWhere ID. This could be used by other organizations to access your [facility] medical records  [id number]         Blood Pressure from Last 3  Encounters:   06/26/18 132/86   02/08/18 125/80   12/15/17 118/70    Weight from Last 3 Encounters:   06/26/18 186 lb 9.6 oz (84.6 kg)   02/08/18 170 lb (77.1 kg)   12/15/17 170 lb (77.1 kg)              Today, you had the following     No orders found for display         Today's Medication Changes          These changes are accurate as of 7/13/18 10:39 AM.  If you have any questions, ask your nurse or doctor.               Start taking these medicines.        Dose/Directions    propranolol 20 MG tablet   Commonly known as:  INDERAL   Used for:  Test anxiety   Started by:  Sarah Simmons PA-C        Dose:  20 mg   Take 1 tablet (20 mg) by mouth 2 times daily   Quantity:  30 tablet   Refills:  0         These medicines have changed or have updated prescriptions.        Dose/Directions    cetirizine 10 MG tablet   Commonly known as:  zyrTEC   This may have changed:    - when to take this  - reasons to take this   Used for:  Chronic allergic conjunctivitis        Dose:  10 mg   Take 1 tablet (10 mg) by mouth every evening   Quantity:  90 tablet   Refills:  3            Where to get your medicines      These medications were sent to [facility] [address]  [address] [facility], [facility] [address]     Phone:  [phone]     propranolol 20 MG tablet                Primary Care Provider Fax #    Physician No Ref-Primary [phone]       No address on file        Equal Access to Services     VERA JORDAN AH: Yan Camara, waaxda lukennediadaha, qaybta kaalmada adematiasyada, mandi velásquez. So [facility] [phone].    ATENCIÓN: Si habla español, tiene a hurtado disposición servicios gratuitos de asistencia lingüística. Llame al [phone].    We comply with applicable federal civil rights laws and [place] laws. We do not discriminate on the basis of race, color, national origin, age, disability, sex, sexual orientation, or gender  identity.            Thank you!     Thank you for choosing [facility]  for your care. Our goal is always to provide you with excellent care. Hearing back from our patients is one way we can continue to improve our services. Please take a few minutes to complete the written survey that you may receive in the mail after your visit with us. Thank you!             Your Updated Medication List - Protect others around you: Learn how to safely use, store and throw away your medicines at www.disposemymeds.org.          This list is accurate as of 7/13/18 10:39 AM.  Always use your most recent med list.                   Brand Name Dispense Instructions for use Diagnosis    * albuterol (5 MG/ML) 0.5% neb solution    PROVENTIL    30 vial    Take 0.5 mLs (2.5 mg) by nebulization every 6 hours as needed    Mild persistent asthma without complication       * albuterol 108 (90 Base) MCG/ACT Inhaler    PROAIR HFA/PROVENTIL HFA/VENTOLIN HFA    1 Inhaler    Inhale 2 puffs into the lungs every 6 hours as needed for shortness of breath / dyspnea or wheezing    Mild persistent asthma without complication       ALPRAZolam 0.5 MG tablet    XANAX    30 tablet    Take 1 tablet (0.5 mg) by mouth 3 times daily as needed for anxiety    Anxiety       cetirizine 10 MG tablet    zyrTEC    90 tablet    Take 1 tablet (10 mg) by mouth every evening    Chronic allergic conjunctivitis       fluticasone 110 MCG/ACT Inhaler    FLOVENT HFA    3 Inhaler    Inhale 2 puffs into the lungs 2 times daily    Mild persistent asthma without complication, Seasonal allergic rhinitis, unspecified chronicity, unspecified trigger       fluticasone 50 MCG/ACT spray    FLONASE    16 g    Spray 2 sprays into both nostrils daily    Chronic allergic conjunctivitis       levonorgestrel 20 MCG/24HR IUD    MIRENA     1 each by Intrauterine route once 04/2012        olopatadine 0.1 % ophthalmic solution    PATANOL    1 Bottle    Place 1 drop into both eyes  2 times daily    Chronic allergic conjunctivitis       order for DME     1 Device    Nebulizer    Mild persistent asthma without complication       propranolol 20 MG tablet    INDERAL    30 tablet    Take 1 tablet (20 mg) by mouth 2 times daily    Test anxiety       traZODone 50 MG tablet    DESYREL    30 tablet    Take 0.5-1 tablets (25-50 mg) by mouth At Bedtime    Primary insomnia       triamcinolone 0.5 % cream    KENALOG    30 g    Apply sparingly to affected area three times daily.    Dermatitis       * Notice:  This list has 2 medication(s) that are the same as other medications prescribed for you. Read the directions carefully, and ask your doctor or other care provider to review them with you.

## 2018-08-13 ENCOUNTER — OFFICE VISIT (OUTPATIENT)
Dept: FAMILY MEDICINE | Facility: CLINIC | Age: 48
End: 2018-08-13
Payer: COMMERCIAL

## 2018-08-13 VITALS
WEIGHT: 183 LBS | HEART RATE: 76 BPM | SYSTOLIC BLOOD PRESSURE: 112 MMHG | HEIGHT: 64 IN | DIASTOLIC BLOOD PRESSURE: 76 MMHG | BODY MASS INDEX: 31.24 KG/M2 | TEMPERATURE: 98.1 F

## 2018-08-13 DIAGNOSIS — F41.9 ANXIETY: ICD-10-CM

## 2018-08-13 DIAGNOSIS — L91.8 SKIN TAG: ICD-10-CM

## 2018-08-13 DIAGNOSIS — J45.20 MILD INTERMITTENT ASTHMA WITHOUT COMPLICATION: ICD-10-CM

## 2018-08-13 DIAGNOSIS — Z00.01 ENCOUNTER FOR ROUTINE ADULT MEDICAL EXAM WITH ABNORMAL FINDINGS: Primary | ICD-10-CM

## 2018-08-13 DIAGNOSIS — Z11.4 SCREENING FOR HIV (HUMAN IMMUNODEFICIENCY VIRUS): ICD-10-CM

## 2018-08-13 DIAGNOSIS — Z12.31 VISIT FOR SCREENING MAMMOGRAM: ICD-10-CM

## 2018-08-13 DIAGNOSIS — Z12.11 SCREEN FOR COLON CANCER: ICD-10-CM

## 2018-08-13 PROCEDURE — 87389 HIV-1 AG W/HIV-1&-2 AB AG IA: CPT | Performed by: PHYSICIAN ASSISTANT

## 2018-08-13 PROCEDURE — 99396 PREV VISIT EST AGE 40-64: CPT | Mod: 25 | Performed by: PHYSICIAN ASSISTANT

## 2018-08-13 PROCEDURE — 36415 COLL VENOUS BLD VENIPUNCTURE: CPT | Performed by: PHYSICIAN ASSISTANT

## 2018-08-13 PROCEDURE — 11200 RMVL SKIN TAGS UP TO&INC 15: CPT | Performed by: PHYSICIAN ASSISTANT

## 2018-08-13 PROCEDURE — 99207 ZZC DROP WITH A PROCEDURE: CPT | Mod: 25 | Performed by: PHYSICIAN ASSISTANT

## 2018-08-13 RX ORDER — ALPRAZOLAM 0.5 MG
0.5 TABLET ORAL 3 TIMES DAILY PRN
Qty: 30 TABLET | Refills: 1 | Status: SHIPPED | OUTPATIENT
Start: 2018-08-13 | End: 2019-03-27

## 2018-08-13 ASSESSMENT — ENCOUNTER SYMPTOMS
FREQUENCY: 0
ABDOMINAL PAIN: 0
JOINT SWELLING: 1
NERVOUS/ANXIOUS: 0
MYALGIAS: 1
CONSTIPATION: 0
HEMATURIA: 0
SORE THROAT: 0
NAUSEA: 0
WEAKNESS: 0
PALPITATIONS: 0
HEMATOCHEZIA: 0
DIARRHEA: 0
COUGH: 0
CHILLS: 0
PARESTHESIAS: 0
DYSURIA: 0
DIZZINESS: 0
HEARTBURN: 0
BREAST MASS: 0
FEVER: 0
EYE PAIN: 0
SHORTNESS OF BREATH: 0
ARTHRALGIAS: 1

## 2018-08-13 NOTE — PROGRESS NOTES
SUBJECTIVE:   CC: Hannah Sung is an 48 year old woman who presents for preventive health visit.     Physical   Annual:     Getting at least 3 servings of Calcium per day:  Yes    Bi-annual eye exam:  Yes    Dental care twice a year:  Yes    Sleep apnea or symptoms of sleep apnea:  None    Diet:  Regular (no restrictions)    Frequency of exercise:  6-7 days/week    Duration of exercise:  Greater than 60 minutes    Taking medications regularly:  Yes    Medication side effects:  None    Additional concerns today:  YES      Anxiety Follow-Up    Status since last visit: Improved. Tried taking Propranolol prior to test taking. Helped some but did have drowsiness    Other associated symptoms:None    Complicating factors:   Significant life event: No   Current substance abuse: None  Depression symptoms: No  DHARA-7 SCORE 6/21/2015 1/10/2017   Total Score 12 -   Total Score - 2       DHARA-7    Today's PHQ-2 Score:   PHQ-2 ( 1999 [employer]) 8/13/2018   Q1: Little interest or pleasure in doing things 0   Q2: Feeling down, depressed or hopeless 0   PHQ-2 Score 0   Q1: Little interest or pleasure in doing things Not at all   Q2: Feeling down, depressed or hopeless Not at all   PHQ-2 Score 0       Abuse: Current or Past(Physical, Sexual or Emotional)- No  Do you feel safe in your environment - Yes    Social History   Substance Use Topics     Smoking status: Never Smoker     Smokeless tobacco: Never Used     Alcohol use Yes      Comment: wine occasionally     Alcohol Use 8/13/2018   If you drink alcohol do you typically have greater than 3 drinks per day OR greater than 7 drinks per week? No   No flowsheet data found.    Reviewed orders with patient.  Reviewed health maintenance and updated orders accordingly - Yes    Patient over age 50, mutual decision to screen reflected in health maintenance.    Pertinent mammograms are reviewed under the imaging tab.  History of abnormal Pap smear: NO - age 30-65 PAP every 5 years with  "negative HPV co-testing recommended  PAP / HPV Latest Ref Rng & Units 4/21/2015   PAP - NIL   HPV 16 DNA NEG Negative   HPV 18 DNA NEG Negative   OTHER HR HPV NEG Negative     Reviewed and updated as needed this visit by clinical staff  Tobacco  Allergies  Meds  Med Hx  Surg Hx  Fam Hx  Soc Hx        Reviewed and updated as needed this visit by Provider  Allergies  Meds            Review of Systems   Constitutional: Negative for chills and fever.   HENT: Negative for congestion, ear pain and sore throat.    Eyes: Negative for pain and visual disturbance.   Respiratory: Negative for cough and shortness of breath.    Cardiovascular: Negative for chest pain, palpitations and peripheral edema.   Gastrointestinal: Negative for abdominal pain, constipation, diarrhea, heartburn, hematochezia and nausea.   Breasts:  Negative for tenderness, breast mass and discharge.   Genitourinary: Negative for dysuria, frequency, genital sores, hematuria, pelvic pain, urgency, vaginal bleeding and vaginal discharge.   Musculoskeletal: Positive for arthralgias, joint swelling and myalgias.   Skin: Negative for rash.   Neurological: Negative for dizziness, weakness and paresthesias.   Psychiatric/Behavioral: Negative for mood changes. The patient is not nervous/anxious.      OBJECTIVE:   /76 (Cuff Size: Adult Large)  Pulse 76  Temp 98.1  F (36.7  C) (Oral)  Ht 5' 4.33\" (1.634 m)  Wt 183 lb (83 kg)  BMI 31.09 kg/m2  Physical Exam  GENERAL: healthy, alert and no distress  EYES: Eyes grossly normal to inspection, PERRL and conjunctivae and sclerae normal  HENT: ear canals and TM's normal, nose and mouth without ulcers or lesions  NECK: no adenopathy, no asymmetry, masses, or scars and thyroid normal to palpation  RESP: lungs clear to auscultation - no rales, rhonchi or wheezes  BREAST: normal without masses, tenderness or nipple discharge and no palpable axillary masses or adenopathy  CV: regular rate and rhythm, normal S1 " "S2, no S3 or S4, no murmur, click or rub, no peripheral edema and peripheral pulses strong  ABDOMEN: soft, nontender, no hepatosplenomegaly, no masses and bowel sounds normal  MS: no gross musculoskeletal defects noted, no edema  SKIN: no suspicious lesions or rashes. Mid lower back with firm pinpoint skin tag.  NEURO: Normal strength and tone, mentation intact and speech normal  PSYCH: mentation appears normal, affect normal/bright    Diagnostic Test Results:  none     ASSESSMENT/PLAN:   1. Encounter for routine adult medical exam with abnormal findings  Follow up pending above results. Encouraged healthy diet and regular exercise, monthy SBE, and yearly exams.    2. Anxiety  Stable, well controlled.  Rare use of Xanax, typically only needing one refill per year.  - ALPRAZolam (XANAX) 0.5 MG tablet; Take 1 tablet (0.5 mg) by mouth 3 times daily as needed for anxiety  Dispense: 30 tablet; Refill: 1    3. Mild intermittent asthma without complication  ACT of 20 at visit in 6/2018    4. Screen for colon cancer  - GASTROENTEROLOGY ADULT REF PROCEDURE ONLY Other; [facility] [phone]    5. Screening for HIV (human immunodeficiency virus)  - HIV Screening    6. Visit for screening mammogram  - MA Screen Bilateral w/Davide; Future    7. Skin tag  Mid back.  The treatments, side effects and failure rates are discussed.  Liquid nitrogen was applied to lesion The expected skin reaction including erythema, pain, scabbing, blistering and hypopigmented scar formation was discussed.  See at intervals until warts resolved.    COUNSELING:  Reviewed preventive health counseling, as reflected in patient instructions    BP Readings from Last 1 Encounters:   08/13/18 112/76     Estimated body mass index is 31.09 kg/(m^2) as calculated from the following:    Height as of this encounter: 5' 4.33\" (1.634 m).    Weight as of this encounter: 183 lb (83 kg).      Weight management plan: Discussed healthy diet and exercise guidelines and " patient will follow up in 6 months in clinic to re-evaluate.     reports that she has never smoked. She has never used smokeless tobacco.      Counseling Resources:  ATP IV Guidelines  Pooled Cohorts Equation Calculator  Breast Cancer Risk Calculator  FRAX Risk Assessment  ICSI Preventive Guidelines  Dietary Guidelines for Americans, 2010  USDA's MyPlate  ASA Prophylaxis  Lung CA Screening    Sarah Simmons PA-C  [facility]  Answers for HPI/ROS submitted by the patient on 8/13/2018   PHQ-2 Score: 0

## 2018-08-13 NOTE — MR AVS SNAPSHOT
After Visit Summary   8/13/2018    Hannah Sung    MRN: [id number]           Patient Information     Date Of Birth          1970        Visit Information        Provider Department      8/13/2018 5:40 PM Sarah Simmons PA-C [facility]        Today's Diagnoses     Encounter for routine adult medical exam with abnormal findings    -  1    Anxiety        Screen for colon cancer        Screening for HIV (human immunodeficiency virus)        Visit for screening mammogram        Mild intermittent asthma without complication        Skin tag          Care Instructions    Sarah or her team will be in touch with you with results.  Call [phone] to schedule a mammogram.  [facility] will call you to schedule an appointment for colonoscopy.    Nice to see you Hannah!    Sarah Simmons PA-C    Preventive Health Recommendations  Female Ages 50 - 64    Yearly exam: See your health care provider every year in order to  o Review health changes.   o Discuss preventive care.    o Review your medicines if your doctor has prescribed any.      Get a Pap test every three years (unless you have an abnormal result and your provider advises testing more often).    If you get Pap tests with HPV test, you only need to test every 5 years, unless you have an abnormal result.     You do not need a Pap test if your uterus was removed (hysterectomy) and you have not had cancer.    You should be tested each year for STDs (sexually transmitted diseases) if you're at risk.     Have a mammogram every 1 to 2 years.    Have a colonoscopy at age 50, or have a yearly FIT test (stool test). These exams screen for colon cancer.      Have a cholesterol test every 5 years, or more often if advised.    Have a diabetes test (fasting glucose) every three years. If you are at risk for diabetes, you should have this test more often.     If you are at risk for osteoporosis (brittle bone disease), think about having a  bone density scan (DEXA).    Shots: Get a flu shot each year. Get a tetanus shot every 10 years.    Nutrition:     Eat at least 5 servings of fruits and vegetables each day.    Eat whole-grain bread, whole-wheat pasta and brown rice instead of white grains and rice.    Get adequate Calcium and Vitamin D.     Lifestyle    Exercise at least 150 minutes a week (30 minutes a day, 5 days a week). This will help you control your weight and prevent disease.    Limit alcohol to one drink per day.    No smoking.     Wear sunscreen to prevent skin cancer.     See your dentist every six months for an exam and cleaning.    See your eye doctor every 1 to 2 years.                            Plantar Fasciitis   What is plantar fasciitis?   Plantar fasciitis is a painful inflammation of the bottom of the foot between the ball of the foot and the heel.   How does it occur?   There are several possible causes of plantar fasciitis, including:     wearing high heels     gaining weight     increased walking, standing, or stair-climbing   If you wear high-heeled shoes, including western-style boots, for long periods of time, the tough, tendonlike tissue of the bottom of your foot can become shorter. This layer of tissue is called fascia. Pain occurs when you stretch fascia that has shortened. This painful stretching might happen, for example, when you walk barefoot after getting out of bed in the morning.   If you gain weight, you might be more likely to have plantar fasciitis, especially if you walk a lot or  shoes with poor heel cushioning. Normally there is a pad of fatty tissue under your heel bone. Weight gain might break down this fat pad and cause heel pain.   Runners may get plantar fasciitis when they change their workout and increase their mileage or frequency of workouts. It can also occur with a change in exercise surface or terrain, or if your shoes are worn out and don't provide enough cushion for your heels.   If  the arches of your foot are abnormally high or low, you are more likely to develop plantar fasciitis than if your arches are normal.   What are the symptoms?   The main symptom of plantar fasciitis is heel pain when you walk. You may also feel pain when you stand and possibly even when you are resting. This pain typically occurs first thing in the morning after you get out of bed, when your foot is placed flat on the floor. The pain occurs because you are stretching the plantar fascia. The pain usually lessens with more walking, but you may have it again after periods of rest.   You may feel no pain when you are sleeping because the position of your feet during rest allows the fascia to shorten and relax.   How is it diagnosed?   Your healthcare provider will ask about your symptoms. He or she will ask if the bottom of your heel is tender and if you have pain when you stretch the bottom of your foot. An X-ray of your heel may be done.   How is it treated?     Give your painful heel lots of rest. You may need to stay completely off your foot for several days when the pain is severe.     Your healthcare provider may recommend or prescribe anti-inflammatory medicines, such as aspirin or ibuprofen. These drugs decrease pain and inflammation. Adults aged 65 years and older should not take non-steroidal anti-inflammatory medicine for more than 7 days without their healthcare provider's approval. Resting your heel on an ice pack for a few minutes several times a day can also help.     Try to cushion your foot. You can do this by wearing athletic shoes, even at work, for awhile. Heel cushions can also be used. The cushions should be worn in both shoes. They are most helpful if you are overweight or an older adult.     Your provider may recommend special arch supports or inserts for your shoes called orthotics, either custom-made or off the shelf. These supports can be particularly helpful if you have flat feet or high  arches.     Your provider may recommend an injection of a cortisone-like medicine.     Lose weight if needed.     A night splint may be recommended. This will keep the plantar fascia stretched while you are sleeping.     Physical therapy for additional treatments may be recommended     Surgery is rarely needed.   How long will the effects last?   You may find that the pain is sometimes worse and sometimes better over time. If you get treatment soon after you notice the pain, the symptoms should stop after several weeks. If, however, you have had plantar fasciitis for a long time, it may take many weeks to months for the pain to go away.   When can I return to my normal activities?   Everyone recovers from an injury at a different rate. Return to your activities will be determined by how soon your foot recovers, not by how many days or weeks it has been since your injury has occurred. In general, the longer you have symptoms before you start treatment, the longer it will take to get better. The goal of rehabilitation is to return you to your normal activities as soon as is safely possible. If you return too soon you may worsen your injury.   You may safely return to your activities when, starting from the top of the list and progressing to the end, each of the following is true:     You have full range of motion in the injured foot compared with the uninjured foot.     You have full strength of the injured foot compared with the uninjured foot.     You can walk straight ahead without significant pain or limping.   How can I prevent plantar fasciitis?   The best way to prevent plantar fasciitis is to wear shoes that are well made and fit your feet. This is especially important when you exercise or walk a lot or stand for a long time on hard surfaces. Get new athletic shoes before your old shoes stop supporting and cushioning your feet.   You should also:     Avoid repeated jarring to the heel.     Keep a healthy  weight.     Do your leg and foot stretching exercises regularly.   Developed by [employer].   Published by [employer].   Last modified: 2008-08-11   Last reviewed: 2008-07-07   This content is reviewed periodically and is subject to change as new health information becomes available. The information is intended to inform and educate and is not a replacement for medical evaluation, advice, diagnosis or treatment by a healthcare professional.   Sports Medicine Advisor 2009.1 Index  Sports Medicine Advisor 2009.1 Credits     2009 [facility] and/or its affiliates. All Rights Reserved.              Plantar Fasciitis Rehabilitation Exercises   You may begin exercising the muscles of your foot right away by gently stretching them as follows:     Prone hip extension: Lie on your stomach with your legs straight out behind you. Tighten the buttocks and thigh muscles of your injured leg and lift it off the floor about 8 inches. Keep your knee straight. Hold for 5 seconds. Then lower your leg and relax. Do 3 sets of 10.     Towel stretch: Sit on a hard surface with one leg stretched out in front of you. Loop a towel around your toes and the ball of your foot and pull the towel toward your body keeping your knee straight. Hold this position for 15 to 30 seconds then relax. Repeat 3 times.   When the towel stretch becomes too easy, you may begin doing the standing calf stretch.     Standing calf stretch: Facing a wall, put your hands against the wall at about eye level. Keep one leg back with the heel on the floor, and the other leg forward. Turn your back foot slightly inward (as if you were pigeon-toed) as you slowly lean into the wall until you feel a stretch in the back of your calf. Hold for 15 to 30 seconds. Repeat 3 times and then switch the position of your legs and repeat the exercise 3 times. Do this exercise several times each day.     Sitting plantar fascia stretch: Sit in a chair and cross one foot over your other  knee. Grab the base of your toes and pull them back toward your leg until you feel a comfortable stretch. Hold 15 seconds and repeat 3 times.   When you can stand comfortably on your injured foot, you can begin standing to stretch the bottom of your foot using the plantar fascia stretch.     Achilles stretch: Stand with the ball of one foot on a stair. Reach for the bottom step with your heel until you feel a stretch in the arch of your foot. Hold this position for 15 to 30 seconds and then relax. Repeat 3 times.   After you have stretched the bottom muscles of your foot, you can begin strengthening the top muscles of your foot.     Frozen can roll: Roll your bare injured foot back and forth from your heel to your mid-arch over a frozen juice can. Repeat for 3 to 5 minutes. This exercise is particularly helpful if done first thing in the morning.     Towel pickup: With your heel on the ground,  a towel with your toes. Release. Repeat 10 to 20 times. When this gets easy, add more resistance by placing a book or small weight on the towel.     Balance and reach exercises   Stand upright next to a chair with your injured leg farthest from the chair. This will provide you with support if you need it. Stand just on the foot of your injured leg. Try to raise the arch of this foot while keeping your toes on the floor.   A. Keep your foot in this position and reach forward in front of you with the hand farthest away from the chair, allowing your knee to bend. Repeat this 10 times while maintaining the arch height. This exercise can be made more difficult by reaching farther in front of you. Do 2 sets.   B.  the same position as above. While maintaining your arch height, reach the hand farthest away from the chair across your body toward the chair. The farther you reach, the more challenging the exercise. Do 2 sets of 10.     Heel raise: Balance yourself while standing behind a chair or counter. Using the chair  to help you, raise your body up onto your toes and hold for 5 seconds. Then slowly lower yourself down without holding onto the chair. Hold onto the chair or counter if you need to. When this exercise becomes less painful, try lowering on one leg only. Repeat 10 times. Do 3 sets of 10.     Side-lying leg lift: Lying on your uninjured side, tighten the front thigh muscles on your top leg and lift that leg 8 to 10 inches away from the other leg. Keep the leg straight and lower slowly. Do 3 sets of 10.   Written by Griselda Pennington, MS, PT, and Shelbie Cotto, PT, [facility], OCS, for [employer].   Published by [employer].   Last modified: 2009-02-09   Last reviewed: 2008-07-07   This content is reviewed periodically and is subject to change as new health information becomes available. The information is intended to inform and educate and is not a replacement for medical evaluation, advice, diagnosis or treatment by a healthcare professional.   Sports Medicine Advisor 2009.1 Index            [facility]   Discharged by : Blanca BAILEY Certified Medical Assistant (AAMA)   Paper scripts provided to patient : 1   If you have any questions regarding to your visit please contact your care team:     Team Silver              Clinic Hours Telephone Number     Dr. Nabil Simmons PA-C   7am-7pm  Monday - Thursday   7am-5pm  Fridays  [phone]   (Appointment scheduling available 24/7)     RN Line  [phone] option 2     Urgent Care - [place] - 11am-9pm Monday-Friday Saturday-Sunday- 9am-5pm     [place] -   5pm-9pm Monday-Friday Saturday-Sunday- 9am-5pm    [phone] - [place]    [phone] - [place]     For a Price Quote for your services, please call our Consumer Price Line at [phone].     What options do I have for visits at the clinic other than the traditional office visit?     To expand how we care for you,  many of our providers are utilizing electronic visits (e-visits) and telephone visits, when medically appropriate, for interactions with their patients rather than a visit in the clinic. We also offer nurse visits for many medical concerns. Just like any other service, we will bill your insurance company for this type of visit based on time spent on the phone with your provider. Not all insurance companies cover these visits. Please check with your medical insurance if this type of visit is covered. You will be responsible for any charges that are not paid by your insurance.   E-visits via [employer]: generally incur a $35.00 fee.     Telephone visits:   Time spent on the phone: *charged based on time that is spent on the phone in increments of 10 minutes. Estimated cost:   5-10 mins $30.00   11-20 mins. $59.00   21-30 mins. $85.00     Use [employer] (secure email communication and access to your chart) to send your primary care provider a message or make an appointment. Ask someone on your Team how to sign up for [employer].     As always, Thank you for trusting us with your health care needs!      [facility]:    Scheduling Appointments  [facility]  Call: [phone]    [facility]  Call: [phone]    [facility]  Call: [phone]    For Gastroenterology referrals   [facility]   Clinics and Surgery Center, 4th Floor   [address] [place] [address]   Appointments: [phone]    WHERE TO GO FOR CARE?  Clinic    Make an appointment if you:       Are sick (cold, cough, flu, sore throat, earache or in pain).       Have a small injury (sprain, small cut, burn or broken bone).       Need a physical exam, Pap smear, vaccine or prescription refill.       Have questions about your health or medicines.    To reach us:      Call [phone] ([phone]). Open 24 hours every day. (For counseling  services, call [phone].)    Log into [employer] at [employer]. (Visit [email or web address] to create an account.) Hospital emergency room    An emergency is a serious or life- threatening problem that must be treated right away.    Call 911 or get to the hospital if you have:      Very bad or sudden:            - Chest pain or pressure         - Bleeding         - Head or belly pain         - Dizziness or trouble seeing, walking or                          Speaking      Problems breathing      Blood in your vomit or you are coughing up blood      A major injury (knocked out, loss of a finger or limb, rape, broken bone protruding from skin)    A mental health crisis. (Or call the Mental Health Crisis line at 1-631.505.9099 or Suicide Prevention Hotline at 1-865.606.3221.)    Open 24 hours every day. You don't need an appointment.     Urgent care    Visit urgent care for sickness or small injuries when the clinic is closed. You don't need an appointment. To check hours or find an urgent care near you, visit [email or web address]. Online care    Get online care from [employer] for more than 70 common problems, like colds, allergies and infections. Open 24 hours every day at:   [email or web address]   Need help deciding?    For advice about where to be seen, you may call your clinic and ask to speak with a nurse. We're here for you 24 hours every day.         If you are deaf or hard of hearing, please let us know. We provide many free services including sign language interpreters, oral interpreters, TTYs, telephone amplifiers, note takers and written materials.               Follow-ups after your visit        Additional Services     GASTROENTEROLOGY ADULT REF PROCEDURE ONLY Other; [facility] [phone]       Last Lab Result: No results found for: CR  Body mass index is 31.09 kg/(m^2).      Patient will be contacted to schedule procedure.     Please be aware that coverage of these services is subject to the terms and  limitations of your health insurance plan.  Call member services at your health plan with any benefit or coverage questions.  Any procedures must be performed at a [facility] facility OR coordinated by your clinic's referral office.    Please bring the following with you to your appointment:    (1) Any X-Rays, CTs or MRIs which have been performed.  Contact the facility where they were done to arrange for  prior to your scheduled appointment.    (2) List of current medications   (3) This referral request   (4) Any documents/labs given to you for this referral                  Future tests that were ordered for you today     Open Future Orders        Priority Expected Expires Ordered    MA Screen Bilateral w/Davide Routine  8/13/2019 8/13/2018            Who to contact     If you have questions or need follow up information about today's clinic visit or your schedule please contact [facility] directly at [phone].  Normal or non-critical lab and imaging results will be communicated to you by MyChart, letter or phone within 4 business days after the clinic has received the results. If you do not hear from us within 7 days, please contact the clinic through [employer] or phone. If you have a critical or abnormal lab result, we will notify you by phone as soon as possible.  Submit refill requests through [employer] or call your pharmacy and they will forward the refill request to us. Please allow 3 business days for your refill to be completed.          Additional Information About Your Visit        MyChart Information     [employer] gives you secure access to your electronic health record. If you see a primary care provider, you can also send messages to your care team and make appointments. If you have questions, please call your primary care clinic.  If you do not have a primary care provider, please call [phone] and they will assist you.        Care EveryWhere ID     This is your Care  "EveryWhere ID. This could be used by other organizations to access your [facility] medical records  [id number]        Your Vitals Were     Pulse Temperature Height BMI (Body Mass Index)          76 98.1  F (36.7  C) (Oral) 5' 4.33\" (1.634 m) 31.09 kg/m2         Blood Pressure from Last 3 Encounters:   08/13/18 112/76   06/26/18 132/86   02/08/18 125/80    Weight from Last 3 Encounters:   08/13/18 183 lb (83 kg)   06/26/18 186 lb 9.6 oz (84.6 kg)   02/08/18 170 lb (77.1 kg)              We Performed the Following     GASTROENTEROLOGY ADULT REF PROCEDURE ONLY Other; [facility] [phone]     HIV Screening          Where to get your medicines      Some of these will need a paper prescription and others can be bought over the counter.  Ask your nurse if you have questions.     Bring a paper prescription for each of these medications     ALPRAZolam 0.5 MG tablet          Primary Care Provider Fax #    Physician No Ref-Primary [phone]       No address on file        Equal Access to Services     [facility] : Hadii shelbie Camara, waphillip clanyc, qaybtiffanie kaalroseline donato, mandi maddox . So [facility] [phone].    ATENCIÓN: Si habla español, tiene a hurtado disposición servicios gratuitos de asistencia lingüística. Sayraame al [phone].    We comply with applicable federal civil rights laws and [place] laws. We do not discriminate on the basis of race, color, national origin, age, disability, sex, sexual orientation, or gender identity.            Thank you!     Thank you for choosing [facility]  for your care. Our goal is always to provide you with excellent care. Hearing back from our patients is one way we can continue to improve our services. Please take a few minutes to complete the written survey that you may receive in the mail after your visit with us. Thank you!             Your Updated Medication List - Protect others around you: Learn how to safely use, " store and throw away your medicines at www.disposemymeds.org.          This list is accurate as of 8/13/18  6:31 PM.  Always use your most recent med list.                   Brand Name Dispense Instructions for use Diagnosis    * albuterol (5 MG/ML) 0.5% neb solution    PROVENTIL    30 vial    Take 0.5 mLs (2.5 mg) by nebulization every 6 hours as needed    Mild persistent asthma without complication       * albuterol 108 (90 Base) MCG/ACT inhaler    PROAIR HFA/PROVENTIL HFA/VENTOLIN HFA    1 Inhaler    Inhale 2 puffs into the lungs every 6 hours as needed for shortness of breath / dyspnea or wheezing    Mild persistent asthma without complication       ALPRAZolam 0.5 MG tablet    XANAX    30 tablet    Take 1 tablet (0.5 mg) by mouth 3 times daily as needed for anxiety    Anxiety       fluticasone 110 MCG/ACT Inhaler    FLOVENT HFA    3 Inhaler    Inhale 2 puffs into the lungs 2 times daily    Mild persistent asthma without complication, Seasonal allergic rhinitis, unspecified chronicity, unspecified trigger       fluticasone 50 MCG/ACT spray    FLONASE    16 g    Spray 2 sprays into both nostrils daily    Chronic allergic conjunctivitis       levonorgestrel 20 MCG/24HR IUD    MIRENA     1 each by Intrauterine route once 04/2012        olopatadine 0.1 % ophthalmic solution    PATANOL    1 Bottle    Place 1 drop into both eyes 2 times daily    Chronic allergic conjunctivitis       order for DME     1 Device    Nebulizer    Mild persistent asthma without complication       propranolol 20 MG tablet    INDERAL    30 tablet    Take 1 tablet (20 mg) by mouth 2 times daily    Test anxiety       traZODone 50 MG tablet    DESYREL    30 tablet    Take 0.5-1 tablets (25-50 mg) by mouth At Bedtime    Primary insomnia       triamcinolone 0.5 % cream    KENALOG    30 g    Apply sparingly to affected area three times daily.    Dermatitis       * Notice:  This list has 2 medication(s) that are the same as other medications  prescribed for you. Read the directions carefully, and ask your doctor or other care provider to review them with you.

## 2018-08-13 NOTE — PATIENT INSTRUCTIONS
Sarah or her team will be in touch with you with results.  Call [phone] to schedule a mammogram.  MARGO Norton will call you to schedule an appointment for colonoscopy.    Nice to see you Hannah!    Sarah Simmons PA-C    Preventive Health Recommendations  Female Ages 50 - 64    Yearly exam: See your health care provider every year in order to  o Review health changes.   o Discuss preventive care.    o Review your medicines if your doctor has prescribed any.      Get a Pap test every three years (unless you have an abnormal result and your provider advises testing more often).    If you get Pap tests with HPV test, you only need to test every 5 years, unless you have an abnormal result.     You do not need a Pap test if your uterus was removed (hysterectomy) and you have not had cancer.    You should be tested each year for STDs (sexually transmitted diseases) if you're at risk.     Have a mammogram every 1 to 2 years.    Have a colonoscopy at age 50, or have a yearly FIT test (stool test). These exams screen for colon cancer.      Have a cholesterol test every 5 years, or more often if advised.    Have a diabetes test (fasting glucose) every three years. If you are at risk for diabetes, you should have this test more often.     If you are at risk for osteoporosis (brittle bone disease), think about having a bone density scan (DEXA).    Shots: Get a flu shot each year. Get a tetanus shot every 10 years.    Nutrition:     Eat at least 5 servings of fruits and vegetables each day.    Eat whole-grain bread, whole-wheat pasta and brown rice instead of white grains and rice.    Get adequate Calcium and Vitamin D.     Lifestyle    Exercise at least 150 minutes a week (30 minutes a day, 5 days a week). This will help you control your weight and prevent disease.    Limit alcohol to one drink per day.    No smoking.     Wear sunscreen to prevent skin cancer.     See your dentist every six months for an exam and  cleaning.    See your eye doctor every 1 to 2 years.                            Plantar Fasciitis   What is plantar fasciitis?   Plantar fasciitis is a painful inflammation of the bottom of the foot between the ball of the foot and the heel.   How does it occur?   There are several possible causes of plantar fasciitis, including:     wearing high heels     gaining weight     increased walking, standing, or stair-climbing   If you wear high-heeled shoes, including western-style boots, for long periods of time, the tough, tendonlike tissue of the bottom of your foot can become shorter. This layer of tissue is called fascia. Pain occurs when you stretch fascia that has shortened. This painful stretching might happen, for example, when you walk barefoot after getting out of bed in the morning.   If you gain weight, you might be more likely to have plantar fasciitis, especially if you walk a lot or  shoes with poor heel cushioning. Normally there is a pad of fatty tissue under your heel bone. Weight gain might break down this fat pad and cause heel pain.   Runners may get plantar fasciitis when they change their workout and increase their mileage or frequency of workouts. It can also occur with a change in exercise surface or terrain, or if your shoes are worn out and don't provide enough cushion for your heels.   If the arches of your foot are abnormally high or low, you are more likely to develop plantar fasciitis than if your arches are normal.   What are the symptoms?   The main symptom of plantar fasciitis is heel pain when you walk. You may also feel pain when you stand and possibly even when you are resting. This pain typically occurs first thing in the morning after you get out of bed, when your foot is placed flat on the floor. The pain occurs because you are stretching the plantar fascia. The pain usually lessens with more walking, but you may have it again after periods of rest.   You may feel no pain  when you are sleeping because the position of your feet during rest allows the fascia to shorten and relax.   How is it diagnosed?   Your healthcare provider will ask about your symptoms. He or she will ask if the bottom of your heel is tender and if you have pain when you stretch the bottom of your foot. An X-ray of your heel may be done.   How is it treated?     Give your painful heel lots of rest. You may need to stay completely off your foot for several days when the pain is severe.     Your healthcare provider may recommend or prescribe anti-inflammatory medicines, such as aspirin or ibuprofen. These drugs decrease pain and inflammation. Adults aged 65 years and older should not take non-steroidal anti-inflammatory medicine for more than 7 days without their healthcare provider's approval. Resting your heel on an ice pack for a few minutes several times a day can also help.     Try to cushion your foot. You can do this by wearing athletic shoes, even at work, for awhile. Heel cushions can also be used. The cushions should be worn in both shoes. They are most helpful if you are overweight or an older adult.     Your provider may recommend special arch supports or inserts for your shoes called orthotics, either custom-made or off the shelf. These supports can be particularly helpful if you have flat feet or high arches.     Your provider may recommend an injection of a cortisone-like medicine.     Lose weight if needed.     A night splint may be recommended. This will keep the plantar fascia stretched while you are sleeping.     Physical therapy for additional treatments may be recommended     Surgery is rarely needed.   How long will the effects last?   You may find that the pain is sometimes worse and sometimes better over time. If you get treatment soon after you notice the pain, the symptoms should stop after several weeks. If, however, you have had plantar fasciitis for a long time, it may take many weeks to  months for the pain to go away.   When can I return to my normal activities?   Everyone recovers from an injury at a different rate. Return to your activities will be determined by how soon your foot recovers, not by how many days or weeks it has been since your injury has occurred. In general, the longer you have symptoms before you start treatment, the longer it will take to get better. The goal of rehabilitation is to return you to your normal activities as soon as is safely possible. If you return too soon you may worsen your injury.   You may safely return to your activities when, starting from the top of the list and progressing to the end, each of the following is true:     You have full range of motion in the injured foot compared with the uninjured foot.     You have full strength of the injured foot compared with the uninjured foot.     You can walk straight ahead without significant pain or limping.   How can I prevent plantar fasciitis?   The best way to prevent plantar fasciitis is to wear shoes that are well made and fit your feet. This is especially important when you exercise or walk a lot or stand for a long time on hard surfaces. Get new athletic shoes before your old shoes stop supporting and cushioning your feet.   You should also:     Avoid repeated jarring to the heel.     Keep a healthy weight.     Do your leg and foot stretching exercises regularly.   Developed by [employer].   Published by [employer].   Last modified: 2008-08-11   Last reviewed: 2008-07-07   This content is reviewed periodically and is subject to change as new health information becomes available. The information is intended to inform and educate and is not a replacement for medical evaluation, advice, diagnosis or treatment by a healthcare professional.   Sports Medicine Advisor 2009.1 Index  Sports Medicine Advisor 2009.1 Credits     2009 [facility] and/or its affiliates. All Rights Reserved.              Plantar  Fasciitis Rehabilitation Exercises   You may begin exercising the muscles of your foot right away by gently stretching them as follows:     Prone hip extension: Lie on your stomach with your legs straight out behind you. Tighten the buttocks and thigh muscles of your injured leg and lift it off the floor about 8 inches. Keep your knee straight. Hold for 5 seconds. Then lower your leg and relax. Do 3 sets of 10.     Towel stretch: Sit on a hard surface with one leg stretched out in front of you. Loop a towel around your toes and the ball of your foot and pull the towel toward your body keeping your knee straight. Hold this position for 15 to 30 seconds then relax. Repeat 3 times.   When the towel stretch becomes too easy, you may begin doing the standing calf stretch.     Standing calf stretch: Facing a wall, put your hands against the wall at about eye level. Keep one leg back with the heel on the floor, and the other leg forward. Turn your back foot slightly inward (as if you were pigeon-toed) as you slowly lean into the wall until you feel a stretch in the back of your calf. Hold for 15 to 30 seconds. Repeat 3 times and then switch the position of your legs and repeat the exercise 3 times. Do this exercise several times each day.     Sitting plantar fascia stretch: Sit in a chair and cross one foot over your other knee. Grab the base of your toes and pull them back toward your leg until you feel a comfortable stretch. Hold 15 seconds and repeat 3 times.   When you can stand comfortably on your injured foot, you can begin standing to stretch the bottom of your foot using the plantar fascia stretch.     Achilles stretch: Stand with the ball of one foot on a stair. Reach for the bottom step with your heel until you feel a stretch in the arch of your foot. Hold this position for 15 to 30 seconds and then relax. Repeat 3 times.   After you have stretched the bottom muscles of your foot, you can begin strengthening the  top muscles of your foot.     Frozen can roll: Roll your bare injured foot back and forth from your heel to your mid-arch over a frozen juice can. Repeat for 3 to 5 minutes. This exercise is particularly helpful if done first thing in the morning.     Towel pickup: With your heel on the ground,  a towel with your toes. Release. Repeat 10 to 20 times. When this gets easy, add more resistance by placing a book or small weight on the towel.     Balance and reach exercises   Stand upright next to a chair with your injured leg farthest from the chair. This will provide you with support if you need it. Stand just on the foot of your injured leg. Try to raise the arch of this foot while keeping your toes on the floor.   A. Keep your foot in this position and reach forward in front of you with the hand farthest away from the chair, allowing your knee to bend. Repeat this 10 times while maintaining the arch height. This exercise can be made more difficult by reaching farther in front of you. Do 2 sets.   B.  the same position as above. While maintaining your arch height, reach the hand farthest away from the chair across your body toward the chair. The farther you reach, the more challenging the exercise. Do 2 sets of 10.     Heel raise: Balance yourself while standing behind a chair or counter. Using the chair to help you, raise your body up onto your toes and hold for 5 seconds. Then slowly lower yourself down without holding onto the chair. Hold onto the chair or counter if you need to. When this exercise becomes less painful, try lowering on one leg only. Repeat 10 times. Do 3 sets of 10.     Side-lying leg lift: Lying on your uninjured side, tighten the front thigh muscles on your top leg and lift that leg 8 to 10 inches away from the other leg. Keep the leg straight and lower slowly. Do 3 sets of 10.   Written by Griselda Pennington MS, PT, and Shelbie Cotto PT, [facility], for [facility].   Published by  [facility].   Last modified: 2009-02-09   Last reviewed: 2008-07-07   This content is reviewed periodically and is subject to change as new health information becomes available. The information is intended to inform and educate and is not a replacement for medical evaluation, advice, diagnosis or treatment by a healthcare professional.   Sports Medicine Advisor 2009.1 Index            [facility]   Discharged by : Blanca BAILEY Certified Medical Assistant (AAMA)   Paper scripts provided to patient : 1   If you have any questions regarding to your visit please contact your care team:     Team Silver              Clinic Hours Telephone Number     Dr. Nabil Simmons PA-C   7am-7pm  Monday - Thursday   7am-5pm  Fridays  [phone]   (Appointment scheduling available 24/7)     RN Line  [phone] option 2     Urgent Care - [place] and [facility] - 11am-9pm Monday-Friday Saturday-Sunday- 9am-5pm     [place] -   5pm-9pm Monday-Friday Saturday-Sunday- 9am-5pm    [phone] - [place]    [phone] - [place]     For a Price Quote for your services, please call our Consumer Price Line at [phone].     What options do I have for visits at the clinic other than the traditional office visit?     To expand how we care for you, many of our providers are utilizing electronic visits (e-visits) and telephone visits, when medically appropriate, for interactions with their patients rather than a visit in the clinic. We also offer nurse visits for many medical concerns. Just like any other service, we will bill your insurance company for this type of visit based on time spent on the phone with your provider. Not all insurance companies cover these visits. Please check with your medical insurance if this type of visit is covered. You will be responsible for any charges that are not paid by your insurance.   E-visits via [employer]:  generally incur a $35.00 fee.     Telephone visits:   Time spent on the phone: *charged based on time that is spent on the phone in increments of 10 minutes. Estimated cost:   5-10 mins $30.00   11-20 mins. $59.00   21-30 mins. $85.00     Use [employer] (secure email communication and access to your chart) to send your primary care provider a message or make an appointment. Ask someone on your Team how to sign up for [employer].     As always, Thank you for trusting us with your health care needs!      [facility]:    Scheduling Appointments  [facility]  Call: [phone]    [facility]  Call: [phone]    [facility]  Call: [phone]    For Gastroenterology referrals   [facility]   Clinics and Surgery Center, 4th Floor   [address] [place] [address]   Appointments: [phone]    WHERE TO GO FOR CARE?  Clinic    Make an appointment if you:       Are sick (cold, cough, flu, sore throat, earache or in pain).       Have a small injury (sprain, small cut, burn or broken bone).       Need a physical exam, Pap smear, vaccine or prescription refill.       Have questions about your health or medicines.    To reach us:      Call [phone] ([phone]). Open 24 hours every day. (For counseling services, call [phone].)    Log into [employer] at [email or web address]. (Visit [email or web address] to create an account.) Hospital emergency room    An emergency is a serious or life- threatening problem that must be treated right away.    Call [phone] or get to the hospital if you have:      Very bad or sudden:            - Chest pain or pressure         - Bleeding         - Head or belly pain         - Dizziness or trouble seeing, walking or                          Speaking      Problems breathing      Blood in your vomit or you are coughing up blood      A major injury (knocked out, loss of a finger  or limb, rape, broken bone protruding from skin)    A mental health crisis. (Or call the Mental Health Crisis line at 1-903.544.2055 or Suicide Prevention Hotline at 1-645.543.7367.)    Open 24 hours every day. You don't need an appointment.     Urgent care    Visit urgent care for sickness or small injuries when the clinic is closed. You don't need an appointment. To check hours or find an urgent care near you, visit [email or web address]. Online care    Get online care from [facility] for more than 70 common problems, like colds, allergies and infections. Open 24 hours every day at:   [email or web address]   Need help deciding?    For advice about where to be seen, you may call your clinic and ask to speak with a nurse. We're here for you 24 hours every day.         If you are deaf or hard of hearing, please let us know. We provide many free services including sign language interpreters, oral interpreters, TTYs, telephone amplifiers, note takers and written materials.

## 2018-08-15 LAB — HIV 1+2 AB+HIV1 P24 AG SERPL QL IA: NONREACTIVE

## 2018-09-13 DIAGNOSIS — J45.30 MILD PERSISTENT ASTHMA WITHOUT COMPLICATION: ICD-10-CM

## 2018-09-13 RX ORDER — ALBUTEROL SULFATE 90 UG/1
2 AEROSOL, METERED RESPIRATORY (INHALATION) EVERY 6 HOURS PRN
Qty: 1 INHALER | Refills: 1 | Status: SHIPPED | OUTPATIENT
Start: 2018-09-13 | End: 2019-04-01

## 2018-09-13 NOTE — TELEPHONE ENCOUNTER
"LOV- 8/13/18. Prescription approved per [facility] Refill Protocol.    Rashmi Meyer RN     Requested Prescriptions   Pending Prescriptions Disp Refills     albuterol (PROAIR HFA/PROVENTIL HFA/VENTOLIN HFA) 108 (90 Base) MCG/ACT inhaler 1 Inhaler 0     Sig: Inhale 2 puffs into the lungs every 6 hours as needed for shortness of breath / dyspnea or wheezing    Asthma Maintenance Inhalers - Anticholinergics Passed    9/13/2018  4:22 PM       Passed - Patient is age 12 years or older       Passed - Asthma control assessment score within normal limits in last 6 months    Please review ACT score.          Passed - Recent (6 mo) or future (30 days) visit within the authorizing provider's specialty    Patient had office visit in the last 6 months or has a visit in the next 30 days with authorizing provider or within the authorizing provider's specialty.  See \"Patient Info\" tab in inbasket, or \"Choose Columns\" in Meds & Orders section of the refill encounter.              "

## 2018-10-04 ENCOUNTER — TRANSFERRED RECORDS (OUTPATIENT)
Dept: HEALTH INFORMATION MANAGEMENT | Facility: CLINIC | Age: 48
End: 2018-10-04

## 2019-03-28 DIAGNOSIS — J45.30 MILD PERSISTENT ASTHMA WITHOUT COMPLICATION: ICD-10-CM

## 2019-03-28 DIAGNOSIS — H10.45 CHRONIC ALLERGIC CONJUNCTIVITIS: ICD-10-CM

## 2019-03-28 DIAGNOSIS — J30.2 SEASONAL ALLERGIC RHINITIS: ICD-10-CM

## 2019-03-28 NOTE — TELEPHONE ENCOUNTER
"Requested Prescriptions   Pending Prescriptions Disp Refills     albuterol (PROAIR HFA/PROVENTIL HFA/VENTOLIN HFA) 108 (90 Base) MCG/ACT inhaler  Last Written Prescription Date:  9/13/2018  Last Fill Quantity: 1 inhaler,  # refills: 1   Last office visit: 8/13/2018 with prescribing provider:  NADER Simmons   Future Office Visit:           Sig: Inhale 2 puffs into the lungs every 6 hours as needed for shortness of breath / dyspnea or wheezing    Asthma Maintenance Inhalers - Anticholinergics Failed - 3/28/2019 12:56 PM       Failed - Asthma control assessment score within normal limits in last 6 months    Please review ACT score.   ACT Total Scores 8/9/2017 12/15/2017 6/26/2018   ACT TOTAL SCORE - - -   ASTHMA ER VISITS - - -   ASTHMA HOSPITALIZATIONS - - -   ACT TOTAL SCORE (Goal Greater than or Equal to 20) 19 20 20   In the past 12 months, how many times did you visit the emergency room for your asthma without being admitted to the hospital? 0 0 0   In the past 12 months, how many times were you hospitalized overnight because of your asthma? 0 0 0          Failed - Recent (6 mo) or future (30 days) visit within the authorizing provider's specialty    Patient had office visit in the last 6 months or has a visit in the next 30 days with authorizing provider or within the authorizing provider's specialty.  See \"Patient Info\" tab in inbasket, or \"Choose Columns\" in Meds & Orders section of the refill encounter.           Passed - Patient is age 12 years or older       Passed - Medication is active on med list                fluticasone (FLOVENT HFA) 110 MCG/ACT inhaler  Last Written Prescription Date:  12/15/2017  Last Fill Quantity: 3 inhaler,  # refills: 3   Last office visit: 8/13/2018 with prescribing provider:  NADER Simmons   Future Office Visit:     3 Inhaler 3     Sig: Inhale 2 puffs into the lungs 2 times daily    Inhaled Steroids Protocol Failed - 3/28/2019 12:56 PM       Failed - Asthma control assessment score " "within normal limits in last 6 months    Please review ACT score.     ACT Total Scores 8/9/2017 12/15/2017 6/26/2018   ACT TOTAL SCORE - - -   ASTHMA ER VISITS - - -   ASTHMA HOSPITALIZATIONS - - -   ACT TOTAL SCORE (Goal Greater than or Equal to 20) 19 20 20   In the past 12 months, how many times did you visit the emergency room for your asthma without being admitted to the hospital? 0 0 0   In the past 12 months, how many times were you hospitalized overnight because of your asthma? 0 0 0            Failed - Recent (6 mo) or future (30 days) visit within the authorizing provider's specialty    Patient had office visit in the last 6 months or has a visit in the next 30 days with authorizing provider or within the authorizing provider's specialty.  See \"Patient Info\" tab in inCswitchsket, or \"Choose Columns\" in Meds & Orders section of the refill encounter.           Passed - Patient is age 12 or older       Passed - Medication is active on med list                olopatadine (PATANOL) 0.1 % ophthalmic solution  Last Written Prescription Date:  12/15/2017  Last Fill Quantity: 1 bottle,  # refills: 12   Last office visit: 8/13/2018 with prescribing provider:  NADER Simmons   Future Office Visit:     1 Bottle 12     Sig: Place 1 drop into both eyes 2 times daily    Miscellaneous Opthalmic Allergy Drops Protocol Passed - 3/28/2019 12:56 PM       Passed - Patient is age 4 or older       Passed - Recent (12 mo) or future (30 days) visit within the authorizing provider's specialty    Patient had office visit in the last 12 months or has a visit in the next 30 days with authorizing provider or within the authorizing provider's specialty.  See \"Patient Info\" tab in inbasket, or \"Choose Columns\" in Meds & Orders section of the refill encounter.             Passed - Medication is active on med list       Passed - Patient is not pregnant       Passed - No positive pregnancy test on record in past 12 mos          "

## 2019-04-01 ENCOUNTER — TELEPHONE (OUTPATIENT)
Dept: FAMILY MEDICINE | Facility: CLINIC | Age: 49
End: 2019-04-01

## 2019-04-01 RX ORDER — OLOPATADINE HYDROCHLORIDE 1 MG/ML
1 SOLUTION/ DROPS OPHTHALMIC 2 TIMES DAILY
Qty: 1 BOTTLE | Refills: 3 | Status: SHIPPED | OUTPATIENT
Start: 2019-04-01

## 2019-04-01 RX ORDER — FLUTICASONE PROPIONATE 110 UG/1
2 AEROSOL, METERED RESPIRATORY (INHALATION) 2 TIMES DAILY
Qty: 1 INHALER | Refills: 0 | Status: SHIPPED | OUTPATIENT
Start: 2019-04-01 | End: 2019-07-23

## 2019-04-01 RX ORDER — ALBUTEROL SULFATE 90 UG/1
2 AEROSOL, METERED RESPIRATORY (INHALATION) EVERY 6 HOURS PRN
Qty: 18 G | Refills: 0 | Status: SHIPPED | OUTPATIENT
Start: 2019-04-01 | End: 2019-07-23

## 2019-04-01 NOTE — TELEPHONE ENCOUNTER
Patient completed ACT in the pharmacy.  Approving 1 refill for Flovent and Ventolin.  Advised patient to schedule appointment for further refills.    Jenn Neves PharmD, [facility]  Pharmacist Manager   [facility]  [phone]

## 2019-04-01 NOTE — TELEPHONE ENCOUNTER
Annual exam advised at 8/13/18 visit with PCP.    Albuterol and flovent:  Routing refill request to provider for review/approval because:  ACT overdue        Patanol eye drops:  Prescription approved per [facility] Refill Protocol.    Samia Hdz RN  [facility]

## 2019-04-02 ASSESSMENT — ASTHMA QUESTIONNAIRES: ACT_TOTALSCORE: 18

## 2019-04-09 DIAGNOSIS — J45.30 MILD PERSISTENT ASTHMA WITHOUT COMPLICATION: ICD-10-CM

## 2019-04-09 DIAGNOSIS — J30.2 SEASONAL ALLERGIC RHINITIS: ICD-10-CM

## 2019-04-09 RX ORDER — FLUTICASONE PROPIONATE 110 UG/1
2 AEROSOL, METERED RESPIRATORY (INHALATION) 2 TIMES DAILY
Qty: 1 INHALER | Refills: 0 | Status: CANCELLED | OUTPATIENT
Start: 2019-04-09

## 2019-04-09 NOTE — TELEPHONE ENCOUNTER
"Requested Prescriptions   Pending Prescriptions Disp Refills     fluticasone (FLOVENT HFA) 110 MCG/ACT inhaler  Last Written Prescription Date:  4/1/2019  Last Fill Quantity: 1 inhaler,  # refills: 0   Last office visit: 8/13/2018 with prescribing provider:  NADER Simmons   Future Office Visit:     1 Inhaler 0     Sig: Inhale 2 puffs into the lungs 2 times daily       Inhaled Steroids Protocol Failed - 4/9/2019 12:21 PM        Failed - Asthma control assessment score within normal limits in last 6 months     Please review ACT score.   ACT Total Scores 12/15/2017 6/26/2018 4/1/2019   ACT TOTAL SCORE - - -   ASTHMA ER VISITS - - -   ASTHMA HOSPITALIZATIONS - - -   ACT TOTAL SCORE (Goal Greater than or Equal to 20) 20 20 18   In the past 12 months, how many times did you visit the emergency room for your asthma without being admitted to the hospital? 0 0 0   In the past 12 months, how many times were you hospitalized overnight because of your asthma? 0 0 0               Failed - Recent (6 mo) or future (30 days) visit within the authorizing provider's specialty     Patient had office visit in the last 6 months or has a visit in the next 30 days with authorizing provider or within the authorizing provider's specialty.  See \"Patient Info\" tab in inbasket, or \"Choose Columns\" in Meds & Orders section of the refill encounter.            Passed - Patient is age 12 or older        Passed - Medication is active on med list          "

## 2019-04-11 NOTE — TELEPHONE ENCOUNTER
"Denied request to refill flovent.    Per Jenn's note on 4/1/2019:    \"Patient completed ACT in the pharmacy.  Approving 1 refill for Flovent and Ventolin.  Advised patient to schedule appointment for further refills.     Jenn Neves PharmD, [facility]  Pharmacist Manager     [facility]  [phone]\"       "

## 2019-04-18 DIAGNOSIS — H10.45 CHRONIC ALLERGIC CONJUNCTIVITIS: ICD-10-CM

## 2019-04-18 RX ORDER — FLUTICASONE PROPIONATE 50 MCG
2 SPRAY, SUSPENSION (ML) NASAL DAILY
Qty: 16 G | Refills: 3 | Status: SHIPPED | OUTPATIENT
Start: 2019-04-18 | End: 2020-07-28

## 2019-04-18 NOTE — TELEPHONE ENCOUNTER
Flonase used for allergies, no ACT needed.    Prescription approved per [facility] Refill Protocol.    Samia Hdz RN  [facility]

## 2019-04-18 NOTE — TELEPHONE ENCOUNTER
"Requested Prescriptions   Pending Prescriptions Disp Refills     fluticasone (FLONASE) 50 MCG/ACT nasal spray  Last Written Prescription Date:  12/15/2017  Last Fill Quantity: 16 g,  # refills: 11   Last office visit: 8/13/2018 with prescribing provider:  NADER Simmons   Future Office Visit:     16 g 11     Sig: Spray 2 sprays into both nostrils daily       Inhaled Steroids Protocol Failed - 4/18/2019 11:20 AM        Failed - Asthma control assessment score within normal limits in last 6 months     Please review ACT score.   ACT Total Scores 12/15/2017 6/26/2018 4/1/2019   ACT TOTAL SCORE - - -   ASTHMA ER VISITS - - -   ASTHMA HOSPITALIZATIONS - - -   ACT TOTAL SCORE (Goal Greater than or Equal to 20) 20 20 18   In the past 12 months, how many times did you visit the emergency room for your asthma without being admitted to the hospital? 0 0 0   In the past 12 months, how many times were you hospitalized overnight because of your asthma? 0 0 0               Failed - Recent (6 mo) or future (30 days) visit within the authorizing provider's specialty     Patient had office visit in the last 6 months or has a visit in the next 30 days with authorizing provider or within the authorizing provider's specialty.  See \"Patient Info\" tab in inbasket, or \"Choose Columns\" in Meds & Orders section of the refill encounter.            Passed - Patient is age 12 or older        Passed - Medication is active on med list          "

## 2019-04-30 ENCOUNTER — TELEPHONE (OUTPATIENT)
Dept: FAMILY MEDICINE | Facility: CLINIC | Age: 49
End: 2019-04-30

## 2019-04-30 ENCOUNTER — MYC MEDICAL ADVICE (OUTPATIENT)
Dept: FAMILY MEDICINE | Facility: CLINIC | Age: 49
End: 2019-04-30

## 2019-04-30 DIAGNOSIS — S06.0X0D CONCUSSION WITHOUT LOSS OF CONSCIOUSNESS, SUBSEQUENT ENCOUNTER: Primary | ICD-10-CM

## 2019-04-30 NOTE — TELEPHONE ENCOUNTER
Patient/family was instructed to return call to [facility] RN directly on the RN Call back line at [phone].       Pawan Aaron RN

## 2019-04-30 NOTE — TELEPHONE ENCOUNTER
Patient has a phone visit scheduled with Sarah today at 1pm.  It states that [place] hit her head and wants to speak to Sarah.      Sarah would like this triaged instead.    Blanca Mejias, Certified Medical Assistant (AAMA)
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
## 2019-04-30 NOTE — TELEPHONE ENCOUNTER
Patient/family was instructed to return call to [facility] RN directly on the RN Call back line at [phone].     Pawan Aaron RN

## 2019-05-08 ENCOUNTER — TELEPHONE (OUTPATIENT)
Dept: FAMILY MEDICINE | Facility: CLINIC | Age: 49
End: 2019-05-08

## 2019-05-08 NOTE — TELEPHONE ENCOUNTER
Panel Management Review      Patient has the following on her problem list:     Asthma review     ACT Total Scores 4/1/2019   ACT TOTAL SCORE -   ASTHMA ER VISITS -   ASTHMA HOSPITALIZATIONS -   ACT TOTAL SCORE (Goal Greater than or Equal to 20) 18   In the past 12 months, how many times did you visit the emergency room for your asthma without being admitted to the hospital? 0   In the past 12 months, how many times were you hospitalized overnight because of your asthma? 0      1. Is Asthma diagnosis on the Problem List? Yes    2. Is Asthma listed on Health Maintenance? Yes    3. Patient is due for:  ACT and AAP      Composite cancer screening  Chart review shows that this patient is due/due soon for the following Mammogram  Summary:    Patient is due/failing the following:   ACT    Action needed:   Patient needs to do ACT.    Type of outreach:    Copy of ACT mailed to patient, will reach out in 5 days.    Questions for provider review:    None                                                                                                                                    Blanca Mejias, Certified Medical Assistant (AAMA)      Chart routed to Care Team .

## 2019-05-08 NOTE — LETTER
May 8, 2019      Hannah Sung  [address]   [place] [address]        Dear Hannah,     Please complete enclosed Asthma Control Test and return it to the clinic in the envelope provided.    Please call or mychart with any questions.      Sincerely,        Sarah Simmons PA-C/sa

## 2019-05-14 ENCOUNTER — TRANSFERRED RECORDS (OUTPATIENT)
Dept: HEALTH INFORMATION MANAGEMENT | Facility: CLINIC | Age: 49
End: 2019-05-14

## 2019-06-11 ENCOUNTER — ANCILLARY PROCEDURE (OUTPATIENT)
Dept: GENERAL RADIOLOGY | Facility: CLINIC | Age: 49
End: 2019-06-11
Attending: NURSE PRACTITIONER
Payer: COMMERCIAL

## 2019-06-11 ENCOUNTER — OFFICE VISIT (OUTPATIENT)
Dept: FAMILY MEDICINE | Facility: CLINIC | Age: 49
End: 2019-06-11
Payer: COMMERCIAL

## 2019-06-11 VITALS
HEIGHT: 65 IN | DIASTOLIC BLOOD PRESSURE: 93 MMHG | HEART RATE: 68 BPM | WEIGHT: 168 LBS | SYSTOLIC BLOOD PRESSURE: 142 MMHG | OXYGEN SATURATION: 97 % | BODY MASS INDEX: 27.99 KG/M2

## 2019-06-11 DIAGNOSIS — M54.2 CERVICALGIA: Primary | ICD-10-CM

## 2019-06-11 DIAGNOSIS — G47.09 OTHER INSOMNIA: ICD-10-CM

## 2019-06-11 DIAGNOSIS — M54.2 CERVICALGIA: ICD-10-CM

## 2019-06-11 DIAGNOSIS — F07.81 POSTCONCUSSION SYNDROME: ICD-10-CM

## 2019-06-11 ASSESSMENT — ANXIETY QUESTIONNAIRES
6. BECOMING EASILY ANNOYED OR IRRITABLE: NOT AT ALL
5. BEING SO RESTLESS THAT IT IS HARD TO SIT STILL: NOT AT ALL
1. FEELING NERVOUS, ANXIOUS, OR ON EDGE: MORE THAN HALF THE DAYS
GAD7 TOTAL SCORE: 8
2. NOT BEING ABLE TO STOP OR CONTROL WORRYING: MORE THAN HALF THE DAYS
7. FEELING AFRAID AS IF SOMETHING AWFUL MIGHT HAPPEN: MORE THAN HALF THE DAYS
7. FEELING AFRAID AS IF SOMETHING AWFUL MIGHT HAPPEN: MORE THAN HALF THE DAYS
GAD7 TOTAL SCORE: 8
3. WORRYING TOO MUCH ABOUT DIFFERENT THINGS: MORE THAN HALF THE DAYS
GAD7 TOTAL SCORE: 8
4. TROUBLE RELAXING: NOT AT ALL

## 2019-06-11 ASSESSMENT — PATIENT HEALTH QUESTIONNAIRE - PHQ9
SUM OF ALL RESPONSES TO PHQ QUESTIONS 1-9: 3
SUM OF ALL RESPONSES TO PHQ QUESTIONS 1-9: 3

## 2019-06-11 ASSESSMENT — MIFFLIN-ST. JEOR: SCORE: 1374.98

## 2019-06-11 ASSESSMENT — PAIN SCALES - GENERAL: PAINLEVEL: MODERATE PAIN (5)

## 2019-06-11 NOTE — PATIENT INSTRUCTIONS
"    Follow up with Janna in 6 weeks .      GENERAL ADVICE  ~ Gradually ease back into your usual activities.  ~ Rest as needed to help your symptoms go away.  - Consider pairing 50 minutes of activity with 10 minutes of rest.  ~ Allow yourself more time for activities.  ~ Write things down.  At home, at work, whenever there is something that you should remember, even it is simple.  SCREENS  ~ Change settings on your phone and computer using the \"Blue Light Filter\" (Night Shift on all [employer] products)   ~ The goal is making screens more yellow and less blue.     ~ If this is not an option you can download this program: SquadMail, to adjust your screen resolution.  ~ Turn screen brightness down  ~ Increase font size  NECK PAIN  ~ Ice or Heat are good~  ~ Massage is ok if it doesn't trigger more symptoms~  ~ Gentle stretches and range-of-motion are helpful  FATIGUE  ~ Daily exercise is strongly encouraged.  Start with a 10 min walk and increase the time as tolerated until you are walking 30 minutes per day.    ANXIETY OR MOOD SWINGS:  ~ If you are irritable or anxious, take a break in a quiet room.  ~ Try using the free Calm panchito for guided breathing and mindfulness/meditation.  ~ Explore WEMS (https://www.headspace.com) for free and easy-to-use meditation guidance.  Diet:  - In principle incorporate more protein, lots of veggies, some fruit, whole grains.    - Less sweets and saturated fat.   - Mediterranean Diet is an easy-to-follow example.  ~ Drink plenty of water throughout the day (8-10 glasses per day)        [facility]   Nurse Line # [phone]   Fax # [phone]  Scheduling; # [phone]  [email or web address]    Thank you for allowing us to be a part of your care.         "
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
## 2019-06-12 ASSESSMENT — ANXIETY QUESTIONNAIRES: GAD7 TOTAL SCORE: 8

## 2019-06-12 ASSESSMENT — PATIENT HEALTH QUESTIONNAIRE - PHQ9: SUM OF ALL RESPONSES TO PHQ QUESTIONS 1-9: 3

## 2019-06-27 ENCOUNTER — THERAPY VISIT (OUTPATIENT)
Dept: PHYSICAL THERAPY | Facility: CLINIC | Age: 49
End: 2019-06-27
Payer: COMMERCIAL

## 2019-06-27 DIAGNOSIS — M54.2 NECK PAIN: ICD-10-CM

## 2019-06-27 PROCEDURE — 97110 THERAPEUTIC EXERCISES: CPT | Mod: GP | Performed by: PHYSICAL THERAPIST

## 2019-06-27 PROCEDURE — 97162 PT EVAL MOD COMPLEX 30 MIN: CPT | Mod: GP | Performed by: PHYSICAL THERAPIST

## 2019-06-27 PROCEDURE — 97140 MANUAL THERAPY 1/> REGIONS: CPT | Mod: GP | Performed by: PHYSICAL THERAPIST

## 2019-06-27 NOTE — PROGRESS NOTES
[facility] Initial Evaluation  Subjective:  [facility] Initial Evaluation    The history is provided by the patient. No  was used.   Hannah Sung being seen for neck pain.   Date of Onset: 4/27/19. Where condition occurred: in the community.Problem occurred: while visiting in the [place], fell backward on a cobblestone road  and reported as 4/10 on pain scale. General health as reported by patient is good. Pertinent medical history includes:  Asthma.      Current medications:  Sleep medication and muscle relaxants.   Primary job tasks include:  Computer work and prolonged sitting.  Pain is described as aching (stiff) and is constant. Pain is the same all the time. Since onset symptoms are gradually improving. Special tests:  X-ray. Previous treatment includes physical therapy. There was mild improvement following previous treatment.   Patient is EPIC .   Barriers include:  None as reported by patient.  Red flags:  None as reported by patient.  Type of problem:  Cervical spine   Condition occurred with:  A fall/slip. This is a new condition    Patient reports pain:  Cervical right side, cervical left side and upper cervical spine. Radiates to:  Head. Associated symptoms:  Loss of motion/stiffness and ringing in ears. Symptoms are exacerbated by certain positions, lifting, looking up or down and rotating head and relieved by nothing.                      Objective:  Standing Alignment:    Cervical/Thoracic:  Cervical lordosis decreased and forward head  Shoulder/UE:  Rounded shoulders              Gait:    Gait Type:  Normal                         Cervical/Thoracic Evaluation    AROM:  AROM Cervical:    Flexion:          WNL  Extension:       20%  Rotation:         Left: 45%     Right: 25%  Side Bend:      Left:     Right:         Cervical Myotomes:  normal                  DTR's:  not assessed          Cervical Dermatomes:  not  assessed                    Cervical Palpation:    Tenderness present at Left:    Upper Trap; Levator; Erector Spinae; Facet and Suboccipitals  Tenderness present at Right:    Upper Trap; Levator; Erector Spinae; Facet and Suboccipitals        Cord Sign:  not assessed                                                                                 Musculoskeletal:        Back:        ROS    Assessment/Plan:    Patient is a 48 year old female with cervical complaints.    Patient has the following significant findings with corresponding treatment plan.                Diagnosis 1:  Neck pain  Pain -  manual therapy, self management and education  Decreased ROM/flexibility - manual therapy, therapeutic exercise and home program  Decreased strength - therapeutic exercise, therapeutic activities and home program  Impaired muscle performance - neuro re-education and home program  Decreased function - therapeutic activities and home program  Impaired posture - neuro re-education and home program    Therapy Evaluation Codes:   1) History comprised of:   Personal factors that impact the plan of care:      None.    Comorbidity factors that impact the plan of care are:      Asthma.     Medications impacting care: Anti-inflammatory, Muscle relaxant and Sleep.  2) Examination of Body Systems comprised of:   Body structures and functions that impact the plan of care:      Cervical spine.   Activity limitations that impact the plan of care are:      Driving, Lifting, Reading/Computer work, Working and Sleeping.  3) Clinical presentation characteristics are:   Evolving/Changing.  4) Decision-Making    Moderate complexity using standardized patient assessment instrument and/or measureable assessment of functional outcome.  Cumulative Therapy Evaluation is: Moderate complexity.    Previous and current functional limitations:  (See Goal Flow Sheet for this information)    Short term and Long term goals: (See Goal Flow Sheet for this  information)     Communication ability:  Patient appears to be able to clearly communicate and understand verbal and written communication and follow directions correctly.  Treatment Explanation - The following has been discussed with the patient:   RX ordered/plan of care  Anticipated outcomes  Possible risks and side effects  This patient would benefit from PT intervention to resume normal activities.   Rehab potential is good.    Frequency:  1 X week, once daily  Duration:  for 8 weeks  Discharge Plan:  Achieve all LTG.  Independent in home treatment program.  Reach maximal therapeutic benefit.    Please refer to the daily flowsheet for treatment today, total treatment time and time spent performing 1:1 timed codes.

## 2019-07-02 ENCOUNTER — TELEPHONE (OUTPATIENT)
Dept: FAMILY MEDICINE | Facility: CLINIC | Age: 49
End: 2019-07-02

## 2019-07-02 DIAGNOSIS — H10.45 CHRONIC ALLERGIC CONJUNCTIVITIS: ICD-10-CM

## 2019-07-02 NOTE — TELEPHONE ENCOUNTER
[facility] faxed a Refill Authorization Request for:    cetirizine (ZYRTEC) 10 MG tablet  DISCONTINUED        Last Written Prescription Date:  12/15/2017  Last Fill Quantity: 90 tablet,   # refills: 3  Last Office Visit: 8/13/2018  NADER Bull    Future Office visit:       Routing refill request to provider for review/approval because:  Drug not active on patient's medication list

## 2019-07-02 NOTE — TELEPHONE ENCOUNTER
Patient/family was instructed to return call to [facility] RN directly on the RN Call back line at [phone].     Need to clarify, did patient request this medication or did pharmacy just send request.     Alis Griffiths RN

## 2019-07-03 ENCOUNTER — THERAPY VISIT (OUTPATIENT)
Dept: PHYSICAL THERAPY | Facility: CLINIC | Age: 49
End: 2019-07-03
Payer: COMMERCIAL

## 2019-07-03 DIAGNOSIS — M54.2 NECK PAIN: ICD-10-CM

## 2019-07-03 PROCEDURE — 97140 MANUAL THERAPY 1/> REGIONS: CPT | Mod: GP | Performed by: PHYSICAL THERAPIST

## 2019-07-03 PROCEDURE — 97110 THERAPEUTIC EXERCISES: CPT | Mod: GP | Performed by: PHYSICAL THERAPIST

## 2019-07-03 PROCEDURE — 97112 NEUROMUSCULAR REEDUCATION: CPT | Mod: GP | Performed by: PHYSICAL THERAPIST

## 2019-07-08 RX ORDER — CETIRIZINE HYDROCHLORIDE 10 MG/1
10 TABLET ORAL DAILY PRN
Qty: 30 TABLET | Refills: 2 | Status: SHIPPED | OUTPATIENT
Start: 2019-07-08

## 2019-07-08 NOTE — TELEPHONE ENCOUNTER
Patient returns call to RN line and requests a final appointment with PCP. Patient scheduled for Wednesday at 1:00p    Alis Griffiths RN

## 2019-07-08 NOTE — TELEPHONE ENCOUNTER
"Routing refill request for cetirizine to PCP, as not currently on medication list. You last prescribed in 2017 for \"chronic allergic conjunctivitis\". Patient states allergies have been acting up, and she is just wanting a refill of the cetirizine. She knows she can purchase OTC, but is thinking it might be cheaper with insurance. Last visit with PCP was 8/2018 for routine physical. Med pended.     Nikki Holguin RN    "

## 2019-07-08 NOTE — TELEPHONE ENCOUNTER
Patient/family was instructed to return call to [facility] RN directly on the RN Call back line at [phone].     Alis Griffiths RN

## 2019-07-10 ENCOUNTER — OFFICE VISIT (OUTPATIENT)
Dept: FAMILY MEDICINE | Facility: CLINIC | Age: 49
End: 2019-07-10
Payer: COMMERCIAL

## 2019-07-10 VITALS
WEIGHT: 179.4 LBS | BODY MASS INDEX: 30.32 KG/M2 | OXYGEN SATURATION: 98 % | HEART RATE: 79 BPM | TEMPERATURE: 97.8 F | SYSTOLIC BLOOD PRESSURE: 115 MMHG | DIASTOLIC BLOOD PRESSURE: 84 MMHG

## 2019-07-10 DIAGNOSIS — M54.2 CERVICALGIA: Primary | ICD-10-CM

## 2019-07-10 DIAGNOSIS — F41.8 TEST ANXIETY: ICD-10-CM

## 2019-07-10 DIAGNOSIS — F07.81 POST CONCUSSION SYNDROME: ICD-10-CM

## 2019-07-10 PROCEDURE — 99214 OFFICE O/P EST MOD 30 MIN: CPT | Performed by: PHYSICIAN ASSISTANT

## 2019-07-10 RX ORDER — NAPROXEN 500 MG/1
500 TABLET ORAL 2 TIMES DAILY WITH MEALS
Qty: 30 TABLET | Refills: 0 | Status: SHIPPED | OUTPATIENT
Start: 2019-07-10 | End: 2019-07-23

## 2019-07-10 RX ORDER — TIZANIDINE 2 MG/1
2 TABLET ORAL
Qty: 20 TABLET | Refills: 1 | Status: SHIPPED | OUTPATIENT
Start: 2019-07-10 | End: 2019-07-23

## 2019-07-10 NOTE — PATIENT INSTRUCTIONS
Know Your Value- Florin Vaz-Great book!    Look for provider that does OMT (ostepathic manipulation therapy and craniosacral therapy?)  Can ask them if this would be helpful.      Naproxen 1 tablet twice daily with food x 1-2 weeks.  Muscle relaxant at night for muscle tightness.  Continue with PT and Concussion Clinic.      Sarah Simmons PA-C

## 2019-07-10 NOTE — PROGRESS NOTES
Subjective     Hannah Sung is a 49 year old female who presents to clinic today for the following health issues:    LYNNETTE Young is here today to discuss refills of her medications and to talk about her recent concussion.    While on vacation in the [place], she fell and it her head on a cobblestone road.  She had a negative CT scan and has been seen by the Concussion clinic.  She continues to have fairly significant neck and upper back pain and stiffness in addition to fatigue.  Does have f/u scheduled with them at the end of the month.  Initially had some numbness/tingling in her L hand but this has resolved.  She does also have a PT appointment scheduled for tomorrow for some ongoing vertigo since the concussion.  Wants to know what dose of ibuprofen she can take.     She is also requesting refill of her propranolol.  Took this in the past for some test anxiety with benefit.     -------------------------------------    Reviewed and updated as needed this visit by Provider         Review of Systems   ROS COMP: Constitutional, HEENT, cardiovascular, pulmonary, gi and gu systems are negative, except as otherwise noted.      Objective    /84   Pulse 79   Temp 97.8  F (36.6  C) (Oral)   Wt 81.4 kg (179 lb 6.4 oz)   LMP  (LMP Unknown)   SpO2 98%   BMI 30.32 kg/m    Body mass index is 30.32 kg/m .  Physical Exam   GENERAL: healthy, alert and no distress  NECK: no adenopathy, no asymmetry, masses, or scars and thyroid normal to palpation  RESP: lungs clear to auscultation - no rales, rhonchi or wheezes  CV: regular rate and rhythm, normal S1 S2, no S3 or S4, no murmur, click or rub, no peripheral edema and peripheral pulses strong  MS: Trapezius with diffuse tenderness to palpation, no bony tenderness present.  Limited ROM 2/2 to tightness and pain.  NEURO: Normal strength and tone, mentation intact and speech normal  PSYCH: mentation appears normal, affect normal/bright    Diagnostic Test  "Results:  Labs reviewed in Epic        Assessment & Plan     1. Cervicalgia   2. Post concussion syndrome  Ongoing neck pain and tightness since fall and concussion while in the [place] in April 2019  Continue with PT as scheduled.  Below medication as directed with full discussion of risks, benefits, and possible adverse effects.  RTC if symptoms worsen or do not continue to improve as anticipated.  Pt understood and agreed with plan.  - tiZANidine (ZANAFLEX) 2 MG tablet; Take 1 tablet (2 mg) by mouth nightly as needed for muscle spasms  Dispense: 20 tablet; Refill: 1  - naproxen (NAPROSYN) 500 MG tablet; Take 1 tablet (500 mg) by mouth 2 times daily (with meals)  Dispense: 30 tablet; Refill: 0    3. Test anxiety  - propranolol (INDERAL) 20 MG tablet; Take 1 tablet (20 mg) by mouth 2 times daily  Dispense: 30 tablet; Refill: 0       BMI:   Estimated body mass index is 30.32 kg/m  as calculated from the following:    Height as of 6/11/19: 1.638 m (5' 4.5\").    Weight as of this encounter: 81.4 kg (179 lb 6.4 oz).   Weight management plan: Discussed healthy diet and exercise guidelines      No follow-ups on file.    Sarah Simmons PA-C  [facility]    "

## 2019-07-11 ENCOUNTER — THERAPY VISIT (OUTPATIENT)
Dept: PHYSICAL THERAPY | Facility: CLINIC | Age: 49
End: 2019-07-11
Payer: COMMERCIAL

## 2019-07-11 DIAGNOSIS — M54.2 NECK PAIN: ICD-10-CM

## 2019-07-11 PROCEDURE — 97112 NEUROMUSCULAR REEDUCATION: CPT | Mod: GP | Performed by: PHYSICAL THERAPIST

## 2019-07-11 PROCEDURE — 97140 MANUAL THERAPY 1/> REGIONS: CPT | Mod: GP | Performed by: PHYSICAL THERAPIST

## 2019-07-11 PROCEDURE — 97110 THERAPEUTIC EXERCISES: CPT | Mod: GP | Performed by: PHYSICAL THERAPIST

## 2019-07-12 RX ORDER — PROPRANOLOL HYDROCHLORIDE 20 MG/1
20 TABLET ORAL 2 TIMES DAILY
Qty: 30 TABLET | Refills: 0 | Status: SHIPPED | OUTPATIENT
Start: 2019-07-12 | End: 2019-12-31

## 2019-07-17 ENCOUNTER — THERAPY VISIT (OUTPATIENT)
Dept: PHYSICAL THERAPY | Facility: CLINIC | Age: 49
End: 2019-07-17
Payer: COMMERCIAL

## 2019-07-17 DIAGNOSIS — M54.2 NECK PAIN: ICD-10-CM

## 2019-07-17 PROCEDURE — 97110 THERAPEUTIC EXERCISES: CPT | Mod: GP | Performed by: PHYSICAL THERAPIST

## 2019-07-17 PROCEDURE — 97140 MANUAL THERAPY 1/> REGIONS: CPT | Mod: GP | Performed by: PHYSICAL THERAPIST

## 2019-07-22 ENCOUNTER — TELEPHONE (OUTPATIENT)
Dept: SURGERY | Facility: CLINIC | Age: 49
End: 2019-07-22

## 2019-07-23 ENCOUNTER — OFFICE VISIT (OUTPATIENT)
Dept: FAMILY MEDICINE | Facility: CLINIC | Age: 49
End: 2019-07-23
Payer: COMMERCIAL

## 2019-07-23 VITALS
WEIGHT: 170 LBS | HEART RATE: 73 BPM | BODY MASS INDEX: 28.32 KG/M2 | OXYGEN SATURATION: 96 % | DIASTOLIC BLOOD PRESSURE: 75 MMHG | SYSTOLIC BLOOD PRESSURE: 115 MMHG | HEIGHT: 65 IN

## 2019-07-23 DIAGNOSIS — H93.8X3 EAR PRESSURE, BILATERAL: ICD-10-CM

## 2019-07-23 DIAGNOSIS — J45.30 MILD PERSISTENT ASTHMA WITHOUT COMPLICATION: ICD-10-CM

## 2019-07-23 DIAGNOSIS — M54.2 CERVICALGIA: Primary | ICD-10-CM

## 2019-07-23 DIAGNOSIS — F07.81 POST CONCUSSION SYNDROME: ICD-10-CM

## 2019-07-23 RX ORDER — FLUTICASONE PROPIONATE 110 UG/1
2 AEROSOL, METERED RESPIRATORY (INHALATION) 2 TIMES DAILY
Qty: 1 INHALER | Refills: 0 | Status: SHIPPED | OUTPATIENT
Start: 2019-07-23 | End: 2019-11-19

## 2019-07-23 RX ORDER — TIZANIDINE 2 MG/1
2 TABLET ORAL
Qty: 20 TABLET | Refills: 1 | Status: SHIPPED | OUTPATIENT
Start: 2019-07-23 | End: 2020-01-16

## 2019-07-23 RX ORDER — NAPROXEN 500 MG/1
500 TABLET ORAL 2 TIMES DAILY WITH MEALS
Qty: 30 TABLET | Refills: 1 | Status: SHIPPED | OUTPATIENT
Start: 2019-07-23 | End: 2021-08-05

## 2019-07-23 RX ORDER — ALBUTEROL SULFATE 90 UG/1
2 AEROSOL, METERED RESPIRATORY (INHALATION) EVERY 6 HOURS PRN
Qty: 18 G | Refills: 0 | Status: SHIPPED | OUTPATIENT
Start: 2019-07-23 | End: 2019-10-01

## 2019-07-23 ASSESSMENT — MIFFLIN-ST. JEOR: SCORE: 1379.05

## 2019-07-23 ASSESSMENT — ANXIETY QUESTIONNAIRES
4. TROUBLE RELAXING: NOT AT ALL
GAD7 TOTAL SCORE: 4
GAD7 TOTAL SCORE: 4
5. BEING SO RESTLESS THAT IT IS HARD TO SIT STILL: NOT AT ALL
GAD7 TOTAL SCORE: 4
3. WORRYING TOO MUCH ABOUT DIFFERENT THINGS: SEVERAL DAYS
1. FEELING NERVOUS, ANXIOUS, OR ON EDGE: SEVERAL DAYS
6. BECOMING EASILY ANNOYED OR IRRITABLE: NOT AT ALL
2. NOT BEING ABLE TO STOP OR CONTROL WORRYING: SEVERAL DAYS
7. FEELING AFRAID AS IF SOMETHING AWFUL MIGHT HAPPEN: SEVERAL DAYS
7. FEELING AFRAID AS IF SOMETHING AWFUL MIGHT HAPPEN: SEVERAL DAYS

## 2019-07-23 ASSESSMENT — PAIN SCALES - GENERAL: PAINLEVEL: MODERATE PAIN (4)

## 2019-07-23 ASSESSMENT — PATIENT HEALTH QUESTIONNAIRE - PHQ9
SUM OF ALL RESPONSES TO PHQ QUESTIONS 1-9: 3
SUM OF ALL RESPONSES TO PHQ QUESTIONS 1-9: 3

## 2019-07-23 NOTE — PROGRESS NOTES
"[facility] Evaluation  July 23, 2019    Hannah Sung is a 49 year old yo female who presents for follow-up evaluation of a concussion post fall.     From June 11, 2019 office visit:     HPI  Time/date of injury: 4/27/19 in the evening  Mechanism: Fell while running on [place] while wearing her sister's shoes.     From chart review: She was vacationing in the [place].  Had a few drinks on 4/27/19, after which she tripped and fell in the street. She hit her head hard on the cobblestone.  Patient reports she had a 2-3 cm raised area on the R side of her head.  No LOC. There was some mild abrasion but no laceration.  She also had abrasions on her R cheek and on the R side of her face near her hairline.  She reports hurting her neck, R shoulder, and R torso. Was able to move arm and shoulders but had difficulty turning neck to L side.  After hitting her head she had a headache, nausea, dizziness, and \"feeling in a fog\".  Rated pain a 5/10 and was taking Tylenol.  When seen in the ED (3 days later), reported she still had an intermittent headache and still felt mentally foggy. Reported R shoulder and R side of body were still sore (rated pain a 4/10).  She is was able to move her neck normally.      Immediate symptoms at time of injury: headache, throbbing and then could not move her head/neck the next day.     Injury specifics:  1. Any retrograde amnesia and how long: No  2. Any Anterograde amnesia: No  3. LOC:  Yes/No  Duration: Yes, she believes she lost consciousness for a few moments, but was quickly able to come to.  4. Imaging done? CT of head in the [place] on 5/1/19, negative.    Today, 7/23/19, she is feeling better.  Her neck pain was quite bothersome a week after her last visit as she had a work training 6/19-6/20 during which she got little sleep, was on call for work prior to, and needed to drive considerable distance to the training. Had considerable nausea and headaches which have " now completely resolved.    She has been seeing PT weekly and this has been very helpful.  Was taking Zanaflex and naproxen in early July x2 days which helped, then forggot to take it on the weekend and fell out of routine.  She is worried about daytime fatigue with Zanaflex; struggles to remember to take Naproxen at night because she doesn't eat later in the evening.    Neck pain - constant  Headache - improved, occurring mostly while at work    Current Symptoms:  CONCUSSION SYMPTOMS ASSESSMENT 6/11/2019 7/23/2019   Headache or Pressure In Head 2 - mild to moderate 2 - mild to moderate   Upset Stomach or Throwing Up 0 - none 0 - none   Problems with Balance 0 - none 1 - mild   Feeling Dizzy 3 - moderate 0 - none   Sensitivity to Light 2 - mild to moderate 0 - none   Sensitivity to Noise 2 - mild to moderate 1 - mild   Mood Changes 0 - none 0 - none   Feeling sluggish, hazy, or foggy 1 - mild 0 - none   Trouble Concentrating, Lack of Focus 1 - mild 1 - mild   Motion Sickness 0 - none 0 - none   Vision Changes 1 - mild 0 - none   Memory Problems 1 - mild 0 - none   Feeling Confused 0 - none 0 - none   Neck Pain 2 - mild to moderate 3 - moderate   Trouble Sleeping 2 - mild to moderate 2 - mild to moderate   Total Number of Symptoms 10 6   Symptom Severity Score 17 10     Exertion:  Activities worsen with:    Physical Activity?: Yes, difficulty with swimming and cannot run.    Cognitive Activity?: Worsened with being on call at work.  Has a work deadline by the end of the month.    Current details of HA:    Quality: Pressure in the back of the head, top of the head on fire   Treating?: Napping on the weekends, some Naproxen   Improved?: Yes    Typical Duration: An hour if intervention   Onset trigger: Work projects   Impact on functioning: makes the work day more of a challenge  Pre-injury frequency of headaches: Rare    Current sleep patterns:  Has not been sleeping with the TV.  Zanaflex a couple nights helped.  Has  been sleeping on the couch more often than she should, which bothers her neck more.  5-6 hours of sleep per night during the week, 8-9 on the weekend with naps.  Wakes up around 3-4am, gets to thinking about work, and then cannot fall back asleep.    REVIEW OF SYSTEMS:  Refer to DocFlowsheets:  Concussion symptoms  GASTROINTESTINAL: Nausea improved, only an issue when there is not enough sleep.  MUSCULOSKELETAL: Neck pain still present, but shoulder blade pain has resolved.  NEUROLOGIC: Still has some numbness of the left first finger once in a while, not lasting more than a half hour  PSYCHIATRIC: see PHQ-9 and DHARA    Answers for HPI/ROS submitted by the patient on 7/23/2019   PHQ9 TOTAL SCORE: 3  DHARA 7 TOTAL SCORE: 4    PERTINENT PAST MEDICAL HISTORY    Risk Factors for Protracted Recovery:    Prior concussion history:  Yes    Previous number:  Two      Longest symptom duration: Current    Did less force cause reinjury: Unsure      Headache History: No    Prior treatment for headache: N/A    History of migraine:    Personal?: No    Family?: yes, dad, sisters, and daughter    Prior treatment for HA, migraines or concussions: ED visit, then returned to normal routine.      Mental health and developmental history:    Anxiety    Dyslexia    Past Medical History:   Diagnosis Date     Allergy      Anxiety      Patient Active Problem List   Diagnosis     CARDIOVASCULAR SCREENING; LDL GOAL LESS THAN 160     Mild intermittent asthma     Seasonal allergic rhinitis     Generalized anxiety disorder     Primary insomnia     Neck pain     Pertinent social history:  Currently using alcohol: Occasional beer and wine  Currently using nicotine: No  Currently using caffeine Coffee 2-3 in the morning and another two during the day  Currently Living at and with: Niece is staying with her    Involved in what sports or activities when healthy: Running six miles daily on the weekends, Core[employer] yoga sculpt daily, and tennis.  Current  "activities include walking six miles daily on the weekends and sometimes during the week.  She is holding back on yoga sculpt and tennis due to the concussion.    Currently Working: Yes, working full time.  She initially had cut her schedule to six and then seven hour days, now back to normal days.  Working from home twice weekly has been beneficial.  She thinks this is manageable.  Normal job duties entail: [employer] support.    Current medications:  Reconciled in chart today by clinic staff and reviewed by me.  Current Outpatient Medications   Medication     albuterol (PROAIR HFA/PROVENTIL HFA/VENTOLIN HFA) 108 (90 Base) MCG/ACT inhaler     albuterol (PROVENTIL) (5 MG/ML) 0.5% neb solution     ALPRAZolam (XANAX) 0.5 MG tablet     cetirizine (ZYRTEC) 10 MG tablet     fluticasone (FLONASE) 50 MCG/ACT nasal spray     fluticasone (FLOVENT HFA) 110 MCG/ACT inhaler     levonorgestrel (MIRENA) 20 MCG/24HR IUD     naproxen (NAPROSYN) 500 MG tablet     olopatadine (PATANOL) 0.1 % ophthalmic solution     order for DME     propranolol (INDERAL) 20 MG tablet     tiZANidine (ZANAFLEX) 2 MG tablet     traZODone (DESYREL) 50 MG tablet     triamcinolone (KENALOG) 0.5 % cream     No current facility-administered medications for this visit.      OBJECTIVE:   /75   Pulse 73   Ht 1.638 m (5' 4.5\")   Wt 77.1 kg (170 lb)   SpO2 96%   BMI 28.73 kg/m      Wt Readings from Last 4 Encounters:   07/23/19 77.1 kg (170 lb)   07/10/19 81.4 kg (179 lb 6.4 oz)   06/11/19 76.2 kg (168 lb)   08/13/18 83 kg (183 lb)       EXAM:  GENERAL: alert, oriented to person, place, time  HEAD: Normocephalic and atraumatic, TM's opaque and intact bilaterally  NECK:  Tender to palpation, limited ROM with movements towards the left side of her body  BACK/SPINE: Full range of motion, some discomfort with leaning to the left.  No tenderness with palpation of the thoracic or lumbar spine.  Gait normal.  Heal-to-toe normal.  PSYCHIATRIC:  Stable, smiling, " making good conversation, excited to be feeling better  Neuro:  Strength:   Shoulder shrug (C5):5/5 - crepitis present   Bicep (C6):5/5   Tricep (C7):5/5  Visual:  JR: yes  EOMI: yes  Nystagmus: no  Painful eye movements: no  Convergence testing: Normal (</= 6 cm)  Coordination:   Finger to Nose: normal   Rapid Alternating Movements: normal  Balance Testing:   Romberg: normal       Gait:   Walk in hallway at normal speed: Able   Cognitive:  Immediate object recall: 4/4  Recall 4 objects at 5 minutes:4/4  Reverse months of the year: Yes  Spell world backwards: Yes  Backwards number string: Yes  Three stage command: Yes    Orange, unicorn, baseball, D.C.    Assessment:   (M54.2) Cervicalgia  (primary encounter diagnosis)  Comment:  Physical therapy has been helping but still has considerable neck pain and stiffness.  Plan: tiZANidine (ZANAFLEX) 2 MG tablet, naproxen         (NAPROSYN) 500 MG tablet, MASSAGE THERAPY         REFERRAL          (F07.81) Post concussion syndrome  Comment: Improving.  Primary remaining symptoms are headache and neck stiffness  Plan: As above.  Continue gradual activity increase and maintain therapies.    (J45.30) Mild persistent asthma without complication  Comment:  Well controlled. Requests refill on inhalers  Plan: albuterol (PROAIR HFA/PROVENTIL HFA/VENTOLIN         HFA) 108 (90 Base) MCG/ACT inhaler, fluticasone        (FLOVENT HFA) 110 MCG/ACT inhaler         ---Much of the session involved teaching about concussion symptoms, triggers and ways to avoid them.   ----We also discussed anti-inflammatory treatment including tumeric, B-Complex vitamin and dietary changes.     Plan:  1. Biggest concern of pt addressed: Sensation of ear pressure bilaterally, neck pain    2. Work restrictions- No restrictions    3. Driving restritions- None    4. Activity recommendations- oderate, low impact aerobic activity.  Continue walking.  No running, tennis, surfing, yoga sculpt.    5. Referral to:  Massage for neck pain  6. Follow up here in 8 weeks.  Letters written today for:  None    Try to take Naproxen with a little food at bedtime to help reduce some of there swelling as well as taking the Zanaflex if you anticipate being able to sleep four about eight hours, which together should improve cervicalgia.    OTC non-sedating antihistamine for sensation of ear fullness.    Time spent in one-on-one evaluation and discussion with patient regarding nature of problem, course, prior treatments, and therapeutic options; 75% of this 60 minute visit  was spent in counseling, including this patient's personal symptom triggers and education thereof.    Jesus Jj RN, AGNP Student, discussed with JOO Grayson, ARACELIP      I was present with the NPP student who participated in the service and in the documentation of the services provided.  I have verified the history and personally performed the physical exam and medical decision making, as documented by the student and edited by me.      JOO Jimenez, ARACELIP  Nurse Practitioner    [facility]   Phone# [phone]   Fax # [phone]  Scheduling; # [phone]

## 2019-07-23 NOTE — NURSING NOTE
"Chief Complaint   Patient presents with     Head Injury     concussion 4/27/2019- fall w/strike to right side of head       Vitals:    07/23/19 1311   BP: 115/75   Pulse: 73   SpO2: 96%   Weight: 77.1 kg (170 lb)   Height: 1.638 m (5' 4.5\")       Body mass index is 28.73 kg/m .      DHARA-7 SCORE 3/27/2019 6/11/2019 7/23/2019   Total Score - - -   Total Score - 8 (mild anxiety) 4 (minimal anxiety)   Total Score 3 8 4     PHQ-9 SCORE 1/10/2017 6/11/2019 7/23/2019   PHQ-9 Total Score - - -   PHQ-9 Total Score MyChart - 3 (Minimal depression) 3 (Minimal depression)   PHQ-9 Total Score 2 3 3     CONCUSSION SYMPTOMS ASSESSMENT 6/11/2019 7/23/2019   Headache or Pressure In Head 2 - mild to moderate 2 - mild to moderate   Upset Stomach or Throwing Up 0 - none 0 - none   Problems with Balance 0 - none 1 - mild   Feeling Dizzy 3 - moderate 0 - none   Sensitivity to Light 2 - mild to moderate 0 - none   Sensitivity to Noise 2 - mild to moderate 1 - mild   Mood Changes 0 - none 0 - none   Feeling sluggish, hazy, or foggy 1 - mild 0 - none   Trouble Concentrating, Lack of Focus 1 - mild 1 - mild   Motion Sickness 0 - none 0 - none   Vision Changes 1 - mild 0 - none   Memory Problems 1 - mild 0 - none   Feeling Confused 0 - none 0 - none   Neck Pain 2 - mild to moderate 3 - moderate   Trouble Sleeping 2 - mild to moderate 2 - mild to moderate   Total Number of Symptoms 10 6   Symptom Severity Score 17 10                         "

## 2019-07-23 NOTE — PATIENT INSTRUCTIONS
Massage therapy. Tyrese will call you to get you scheduled.    Try to take Naproxen with a little food at bedtime to help reduce some of there swelling as well as taking the Zanaflex if you anticipate being able to sleep four about eight hours, which together should improve cervicalgia.     OTC non-sedating antihistamine for sensation of ear fullness.     Time spent in one-on-one evaluation and discussion with patient regarding nature of problem, course, prior treatments, and therapeutic options; 75% of this 60 minute visit  was spent in counseling, including this patient's personal symptom triggers and education thereof.    [facility]   Nurse Line # [phone]   Fax # [phone]  Scheduling; # [phone]    Thank you for allowing us to be a part of your care.

## 2019-07-24 ASSESSMENT — ANXIETY QUESTIONNAIRES: GAD7 TOTAL SCORE: 4

## 2019-07-24 ASSESSMENT — PATIENT HEALTH QUESTIONNAIRE - PHQ9: SUM OF ALL RESPONSES TO PHQ QUESTIONS 1-9: 3

## 2019-07-25 ENCOUNTER — THERAPY VISIT (OUTPATIENT)
Dept: PHYSICAL THERAPY | Facility: CLINIC | Age: 49
End: 2019-07-25
Payer: COMMERCIAL

## 2019-07-25 DIAGNOSIS — M54.2 NECK PAIN: ICD-10-CM

## 2019-07-25 PROCEDURE — 97110 THERAPEUTIC EXERCISES: CPT | Mod: GP | Performed by: PHYSICAL THERAPIST

## 2019-07-25 PROCEDURE — 97140 MANUAL THERAPY 1/> REGIONS: CPT | Mod: GP | Performed by: PHYSICAL THERAPIST

## 2019-08-01 ENCOUNTER — THERAPY VISIT (OUTPATIENT)
Dept: PHYSICAL THERAPY | Facility: CLINIC | Age: 49
End: 2019-08-01
Payer: COMMERCIAL

## 2019-08-01 DIAGNOSIS — M54.2 NECK PAIN: ICD-10-CM

## 2019-08-01 PROCEDURE — 97110 THERAPEUTIC EXERCISES: CPT | Mod: GP | Performed by: PHYSICAL THERAPIST

## 2019-08-01 PROCEDURE — 97140 MANUAL THERAPY 1/> REGIONS: CPT | Mod: GP | Performed by: PHYSICAL THERAPIST

## 2019-08-05 ENCOUNTER — THERAPY VISIT (OUTPATIENT)
Dept: PHYSICAL THERAPY | Facility: CLINIC | Age: 49
End: 2019-08-05
Payer: COMMERCIAL

## 2019-08-05 DIAGNOSIS — M54.2 NECK PAIN: ICD-10-CM

## 2019-08-05 PROCEDURE — 97140 MANUAL THERAPY 1/> REGIONS: CPT | Mod: GP | Performed by: PHYSICAL THERAPIST

## 2019-08-05 PROCEDURE — 97110 THERAPEUTIC EXERCISES: CPT | Mod: GP | Performed by: PHYSICAL THERAPIST

## 2019-08-22 ENCOUNTER — THERAPY VISIT (OUTPATIENT)
Dept: PHYSICAL THERAPY | Facility: CLINIC | Age: 49
End: 2019-08-22
Payer: COMMERCIAL

## 2019-08-22 DIAGNOSIS — M54.2 NECK PAIN: ICD-10-CM

## 2019-08-22 PROCEDURE — 97140 MANUAL THERAPY 1/> REGIONS: CPT | Mod: GP | Performed by: PHYSICAL THERAPIST

## 2019-08-22 PROCEDURE — 97110 THERAPEUTIC EXERCISES: CPT | Mod: GP | Performed by: PHYSICAL THERAPIST

## 2019-08-22 NOTE — PROGRESS NOTES
Subjective:  HPI                    Objective:  System    Physical Exam    General     ROS    Assessment/Plan:    PROGRESS  REPORT    Progress reporting period is from  to 8/22/19.       SUBJECTIVE  Subjective changes noted by patient:   Subjective: Hannah mentions she is getting better but would like few aditional visits as she still has tightness and pain in her neck muscles    Current pain level is  Current Pain level: 2/10.     Previous pain level was    .   Changes in function:  Yes (See Goal flowsheet attached for changes in current functional level)  Adverse reaction to treatment or activity: None    OBJECTIVE  Changes noted in objective findings:  Yes,   Objective: Good posture without cues, improving flexibility demonstrated, End range C/S AROM pain mentioned. Tightness UT left side. Good scap stabilization demonstrated, Need to work on strenghtening exercises     ASSESSMENT/PLAN  Updated problem list and treatment plan: Diagnosis 1:  Neck pain  Pain -  hot/cold therapy, US, electric stimulation, manual therapy, STS, splint/taping/bracing/orthotics, self management, education, directional preference exercise and home program  Decreased ROM/flexibility - manual therapy, therapeutic exercise, therapeutic activity and home program  Decreased strength - therapeutic exercise, therapeutic activities and home program  STG/LTGs have been met or progress has been made towards goals:  Yes (See Goal flow sheet completed today.)  Assessment of Progress: The patient's condition is improving.  The patient's condition has potential to improve.  Patient is meeting short term goals and is progressing towards long term goals.  Self Management Plans:  Patient has been instructed in a home treatment program.  Patient  has been instructed in self management of symptoms.  I have re-evaluated this patient and find that the nature, scope, duration and intensity of the therapy is appropriate for the medical condition of the  patientDidi Young continues to require the following intervention to meet STG and LTG's:  PT    Recommendations:  This patient would benefit from continued therapy.     Frequency:  1 X week, once daily  Duration:  for 4 weeks        Please refer to the daily flowsheet for treatment today, total treatment time and time spent performing 1:1 timed codes.

## 2019-09-18 PROBLEM — M54.2 NECK PAIN: Status: RESOLVED | Noted: 2019-06-27 | Resolved: 2019-09-18

## 2019-10-01 ENCOUNTER — OFFICE VISIT (OUTPATIENT)
Dept: FAMILY MEDICINE | Facility: CLINIC | Age: 49
End: 2019-10-01
Payer: COMMERCIAL

## 2019-10-01 VITALS
DIASTOLIC BLOOD PRESSURE: 82 MMHG | BODY MASS INDEX: 28.32 KG/M2 | WEIGHT: 170 LBS | OXYGEN SATURATION: 99 % | HEART RATE: 55 BPM | HEIGHT: 65 IN | SYSTOLIC BLOOD PRESSURE: 129 MMHG

## 2019-10-01 DIAGNOSIS — M54.2 CERVICALGIA: Primary | ICD-10-CM

## 2019-10-01 RX ORDER — COVID-19 ANTIGEN TEST
440 KIT MISCELLANEOUS 2 TIMES DAILY WITH MEALS
COMMUNITY

## 2019-10-01 ASSESSMENT — ANXIETY QUESTIONNAIRES
7. FEELING AFRAID AS IF SOMETHING AWFUL MIGHT HAPPEN: NOT AT ALL
5. BEING SO RESTLESS THAT IT IS HARD TO SIT STILL: NOT AT ALL
2. NOT BEING ABLE TO STOP OR CONTROL WORRYING: SEVERAL DAYS
4. TROUBLE RELAXING: NOT AT ALL
1. FEELING NERVOUS, ANXIOUS, OR ON EDGE: NOT AT ALL
GAD7 TOTAL SCORE: 2
3. WORRYING TOO MUCH ABOUT DIFFERENT THINGS: SEVERAL DAYS
7. FEELING AFRAID AS IF SOMETHING AWFUL MIGHT HAPPEN: NOT AT ALL
GAD7 TOTAL SCORE: 2
GAD7 TOTAL SCORE: 2
6. BECOMING EASILY ANNOYED OR IRRITABLE: NOT AT ALL

## 2019-10-01 ASSESSMENT — PATIENT HEALTH QUESTIONNAIRE - PHQ9
SUM OF ALL RESPONSES TO PHQ QUESTIONS 1-9: 2
SUM OF ALL RESPONSES TO PHQ QUESTIONS 1-9: 2

## 2019-10-01 ASSESSMENT — PAIN SCALES - GENERAL: PAINLEVEL: MODERATE PAIN (5)

## 2019-10-01 ASSESSMENT — MIFFLIN-ST. JEOR: SCORE: 1379.05

## 2019-10-01 NOTE — PATIENT INSTRUCTIONS
Neck Stiffness and Pain:  Hot packs to neck  Ibuprofen 600 mg three times daily X 3-4 days  Schedule with Physiatry for pain injections  Continue Chiropractic as needed  Try to reduce caffeine use - avoid after 12 noon

## 2019-10-01 NOTE — PROGRESS NOTES
"[facility] Follow up October 1, 2019    Hannah Sung is a 49 year old yo female who presents for follow-up evaluation of head injury with concussion in April 2019.    HPI  Time/date of injury: 4/27/19 in the evening  Mechanism: Fell while running on [place] while wearing her sister's shoes.     From chart review: She was vacationing in the [place].  Had a few drinks on 4/27/19, after which she tripped and fell in the street. She hit her head hard on the cobblestone.  Patient reports she had a 2-3 cm raised area on the R side of her head.  No LOC. There was some mild abrasion but no laceration.  She also had abrasions on her R cheek and on the R side of her face near her hairline.  She reports hurting her neck, R shoulder, and R torso. Was able to move arm and shoulders but had difficulty turning neck to L side.  After hitting her head she had a headache, nausea, dizziness, and \"feeling in a fog\".  Rated pain a 5/10 and was taking Tylenol.  When seen in the ED (3 days later), reported she still had an intermittent headache and still felt mentally foggy. Reported R shoulder and R side of body were still sore (rated pain a 4/10).  She is was able to move her neck normally. Immediate symptoms at time of injury: headache, throbbing and then could not move her head/neck the next day.    Today she has some persistent neck pain and stiffness, otherwise symptoms have pretty much resolved.  Occasional headaches but no more than her pre-concussion baseline.  With stressful days at work can become mentally tired.  Denies memory or problem solving concerns.  Also denies dizziness, balance problems, emotional changes or irritability.        Current Symptoms:  CONCUSSION SYMPTOMS ASSESSMENT 6/11/2019 7/23/2019 10/1/2019   Headache or Pressure In Head 2 - mild to moderate 2 - mild to moderate 2 - mild to moderate   Upset Stomach or Throwing Up 0 - none 0 - none 0 - none   Problems with Balance 0 - none 1 - mild " 0 - none   Feeling Dizzy 3 - moderate 0 - none 1 - mild   Sensitivity to Light 2 - mild to moderate 0 - none 0 - none   Sensitivity to Noise 2 - mild to moderate 1 - mild 1 - mild   Mood Changes 0 - none 0 - none 0 - none   Feeling sluggish, hazy, or foggy 1 - mild 0 - none 0 - none   Trouble Concentrating, Lack of Focus 1 - mild 1 - mild 0 - none   Motion Sickness 0 - none 0 - none 0 - none   Vision Changes 1 - mild 0 - none 0 - none   Memory Problems 1 - mild 0 - none 1 - mild   Feeling Confused 0 - none 0 - none 0 - none   Neck Pain 2 - mild to moderate 3 - moderate 4 - moderate to severe   Trouble Sleeping 2 - mild to moderate 2 - mild to moderate 3 - moderate   Total Number of Symptoms 10 6 6   Symptom Severity Score 17 10 12      Answers for HPI/ROS submitted by the patient on 10/1/2019   PHQ9 TOTAL SCORE: 2  DHARA 7 TOTAL SCORE: 2        Current sleep patterns:   Difficuty with waking up at night, thinking about things & worrying.  Getting 6 hrs sleep during the week, 9-10 hrs on weekends.  Has not been taking HS Trazedone - willing to restart.      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    REVIEW OF SYSTEMS:  Refer to DocFlowsheets:  Concussion symptoms.  Neck stiffness and discomfort.  Chiropractic treatment has been helpful and requests referral.  Has not been taking ibuprofen or muscle relaxant - willing to restart.    GASTROINTESTINAL:  None.  Nausea resolved  MUSCULOSKELETAL:  Neck pain as above.  No other myalgias, arthralgias.  NEUROLOGIC:  Mental tiredness with stressful days as work, otherwise none. See HPI     PSYCHIATRIC: see PHQ-9 and DHARA    Pertinent social history:  Currently using alcohol:  Weekend outings with friends 1-2 glasses, red wine with dinner on weekends  Currently using nicotine:  None   Currently using caffeine:  Trying to limit to 1-2 cups.  2 cups during the day.    Street drugs,  "Cannabis:  None    Currently Living at and with: Living alone past 3 years; occasional panic attacks that she attributes to being alone and getting frightened.     Involved in what sports or activities when healthy: Running again since late August which is going well at 4-5 miles on weekends.      Current medications:  Reconciled in chart today by clinic staff and reviewed by me.  Current Outpatient Medications   Medication     albuterol (PROAIR HFA/PROVENTIL HFA/VENTOLIN HFA) 108 (90 Base) MCG/ACT inhaler     albuterol (PROVENTIL) (5 MG/ML) 0.5% neb solution     ALPRAZolam (XANAX) 0.5 MG tablet     cetirizine (ZYRTEC) 10 MG tablet     fluticasone (FLONASE) 50 MCG/ACT nasal spray     fluticasone (FLOVENT HFA) 110 MCG/ACT inhaler     levonorgestrel (MIRENA) 20 MCG/24HR IUD     naproxen (NAPROSYN) 500 MG tablet     naproxen sodium 220 MG capsule     olopatadine (PATANOL) 0.1 % ophthalmic solution     order for DME     tiZANidine (ZANAFLEX) 2 MG tablet     triamcinolone (KENALOG) 0.5 % cream     propranolol (INDERAL) 20 MG tablet     traZODone (DESYREL) 50 MG tablet     No current facility-administered medications for this visit.        OBJECTIVE:   /82   Pulse 55   Ht 1.638 m (5' 4.5\")   Wt 77.1 kg (170 lb)   SpO2 99%   BMI 28.73 kg/m      Wt Readings from Last 4 Encounters:   10/01/19 77.1 kg (170 lb)   07/23/19 77.1 kg (170 lb)   07/10/19 81.4 kg (179 lb 6.4 oz)   06/11/19 76.2 kg (168 lb)       EXAM:  GENERAL: alert, oriented to person, place, time  HEAD:  Normocephalic, atruamatic  NECK:  Supple, Full ROM, mildly tender to palpation  BACK/SPINE:  No paraspinous tenderness   PSYCHIATRIC:  Normal mood, mentation, affect     Neuro  Strength:   Shoulder shrug (C5):5/5   Bicep (C6):5/5   Tricep (C7):5/5  Visual:  JR: yes  EOMI: yes  Nystagmus: no  Painful eye movements: no  Convergence testing: Normal (</= 6 cm)  Coordination:   Finger to Nose: normal   Rapid Alternating Movements: normal  Balance " Testing:   Romberg: normal  Gait:   Walk in hallway at normal speed: Able   Cognitive:  Immediate object recall: 4/4  Recall 4 objects at 5 minutes:4/4  Reverse months of the year:  Intact, fluid  Backwards number string:  Intact      Assessment:  (M54.2) Cervicalgia  (primary encounter diagnosis)  Comment:  Persistent and follows from mild head injury with concussion symptoms in April 2019.    Plan:    Hot pack to neck, Gentle stretching exercises  Ibuprofen 600 mg TID with Food X 3-4 days, then taper  Muscle relaxant at HS as needed  Continue chiropractic treatments as needed and helpful  Referral to PM&R for injections  Disposition:  Follow up with concussion clinic as needed and with PCP.     Time spent in one-on-one evaluation and discussion with patient regarding nature of problem, course, prior treatments, and therapeutic options, 75% of this 60 minute visit  was spent in counseling including this patients personal symptom triggers and education thereof.    Janna Lazaro NP    [facility]   Phone# [phone]   Fax # [phone]  Scheduling; # [phone]

## 2019-10-01 NOTE — NURSING NOTE
"Chief Complaint   Patient presents with     Head Injury     concussion w/o loc - Fall with strike to Right side of head.       Vitals:    10/01/19 1051   BP: 129/82   Pulse: 55   SpO2: 99%   Weight: 77.1 kg (170 lb)   Height: 1.638 m (5' 4.5\")       Body mass index is 28.73 kg/m .      DHARA-7 SCORE 6/11/2019 7/23/2019 10/1/2019   Total Score - - -   Total Score 8 (mild anxiety) 4 (minimal anxiety) 2 (minimal anxiety)   Total Score 8 4 2     PHQ-9 SCORE 6/11/2019 7/23/2019 10/1/2019   PHQ-9 Total Score - - -   PHQ-9 Total Score MyChart 3 (Minimal depression) 3 (Minimal depression) 2 (Minimal depression)   PHQ-9 Total Score 3 3 2     CONCUSSION SYMPTOMS ASSESSMENT 6/11/2019 7/23/2019 10/1/2019   Headache or Pressure In Head 2 - mild to moderate 2 - mild to moderate 2 - mild to moderate   Upset Stomach or Throwing Up 0 - none 0 - none 0 - none   Problems with Balance 0 - none 1 - mild 0 - none   Feeling Dizzy 3 - moderate 0 - none 1 - mild   Sensitivity to Light 2 - mild to moderate 0 - none 0 - none   Sensitivity to Noise 2 - mild to moderate 1 - mild 1 - mild   Mood Changes 0 - none 0 - none 0 - none   Feeling sluggish, hazy, or foggy 1 - mild 0 - none 0 - none   Trouble Concentrating, Lack of Focus 1 - mild 1 - mild 0 - none   Motion Sickness 0 - none 0 - none 0 - none   Vision Changes 1 - mild 0 - none 0 - none   Memory Problems 1 - mild 0 - none 1 - mild   Feeling Confused 0 - none 0 - none 0 - none   Neck Pain 2 - mild to moderate 3 - moderate 4 - moderate to severe   Trouble Sleeping 2 - mild to moderate 2 - mild to moderate 3 - moderate   Total Number of Symptoms 10 6 6   Symptom Severity Score 17 10 12                         "

## 2019-10-02 ENCOUNTER — TELEPHONE (OUTPATIENT)
Dept: SURGERY | Facility: CLINIC | Age: 49
End: 2019-10-02

## 2019-10-02 ENCOUNTER — DOCUMENTATION ONLY (OUTPATIENT)
Dept: CARE COORDINATION | Facility: CLINIC | Age: 49
End: 2019-10-02

## 2019-10-02 ASSESSMENT — PATIENT HEALTH QUESTIONNAIRE - PHQ9: SUM OF ALL RESPONSES TO PHQ QUESTIONS 1-9: 2

## 2019-10-02 ASSESSMENT — ANXIETY QUESTIONNAIRES: GAD7 TOTAL SCORE: 2

## 2019-11-04 ENCOUNTER — HEALTH MAINTENANCE LETTER (OUTPATIENT)
Age: 49
End: 2019-11-04

## 2019-11-15 ENCOUNTER — TELEPHONE (OUTPATIENT)
Dept: FAMILY MEDICINE | Facility: CLINIC | Age: 49
End: 2019-11-15

## 2019-11-15 NOTE — TELEPHONE ENCOUNTER
Panel Management Review      Patient has the following on her problem list:     Asthma review     ACT Total Scores 4/1/2019   ACT TOTAL SCORE -   ASTHMA ER VISITS -   ASTHMA HOSPITALIZATIONS -   ACT TOTAL SCORE (Goal Greater than or Equal to 20) 18   In the past 12 months, how many times did you visit the emergency room for your asthma without being admitted to the hospital? 0   In the past 12 months, how many times were you hospitalized overnight because of your asthma? 0      1. Is Asthma diagnosis on the Problem List? Yes    2. Is Asthma listed on Health Maintenance? Yes    3. Patient is due for:  ACT      Composite cancer screening  Chart review shows that this patient is due/due soon for the following Mammogram  Summary:    Patient is due/failing the following:   Influenza vaccine, ACT, MAMMOGRAM and PHYSICAL    Action needed:   above    Type of outreach:    Sent moneymeets message.    Questions for provider review:    None                                                                                                                                    Monica Lewis,        Chart routed to Care Team .

## 2019-11-19 ENCOUNTER — OFFICE VISIT (OUTPATIENT)
Dept: FAMILY MEDICINE | Facility: CLINIC | Age: 49
End: 2019-11-19
Payer: COMMERCIAL

## 2019-11-19 VITALS — HEART RATE: 58 BPM | DIASTOLIC BLOOD PRESSURE: 84 MMHG | OXYGEN SATURATION: 97 % | SYSTOLIC BLOOD PRESSURE: 126 MMHG

## 2019-11-19 DIAGNOSIS — L91.8 SKIN TAG: ICD-10-CM

## 2019-11-19 DIAGNOSIS — Z76.89 ESTABLISHING CARE WITH NEW DOCTOR, ENCOUNTER FOR: ICD-10-CM

## 2019-11-19 DIAGNOSIS — J45.30 MILD PERSISTENT ASTHMA WITHOUT COMPLICATION: ICD-10-CM

## 2019-11-19 DIAGNOSIS — M54.2 CERVICALGIA: ICD-10-CM

## 2019-11-19 DIAGNOSIS — Z00.00 PREVENTATIVE HEALTH CARE: ICD-10-CM

## 2019-11-19 DIAGNOSIS — J45.20 MILD INTERMITTENT ASTHMA WITHOUT COMPLICATION: Primary | ICD-10-CM

## 2019-11-19 LAB
ALBUMIN SERPL-MCNC: 3.6 G/DL (ref 3.4–5)
ALBUMIN UR-MCNC: NEGATIVE MG/DL
ALP SERPL-CCNC: 51 U/L (ref 40–150)
ALT SERPL W P-5'-P-CCNC: 23 U/L (ref 0–50)
ANION GAP SERPL CALCULATED.3IONS-SCNC: 6 MMOL/L (ref 3–14)
APPEARANCE UR: CLEAR
AST SERPL W P-5'-P-CCNC: 19 U/L (ref 0–45)
BACTERIA #/AREA URNS HPF: ABNORMAL /HPF
BILIRUB SERPL-MCNC: 0.5 MG/DL (ref 0.2–1.3)
BILIRUB UR QL STRIP: NEGATIVE
BUN SERPL-MCNC: 11 MG/DL (ref 7–30)
CALCIUM SERPL-MCNC: 8.8 MG/DL (ref 8.5–10.1)
CHLORIDE SERPL-SCNC: 104 MMOL/L (ref 94–109)
CHOLEST SERPL-MCNC: 201 MG/DL
CO2 SERPL-SCNC: 26 MMOL/L (ref 20–32)
COLOR UR AUTO: ABNORMAL
CREAT SERPL-MCNC: 0.84 MG/DL (ref 0.52–1.04)
ERYTHROCYTE [DISTWIDTH] IN BLOOD BY AUTOMATED COUNT: 12.5 % (ref 10–15)
GFR SERPL CREATININE-BSD FRML MDRD: 80 ML/MIN/{1.73_M2}
GLUCOSE SERPL-MCNC: 95 MG/DL (ref 70–99)
GLUCOSE UR STRIP-MCNC: NEGATIVE MG/DL
HBA1C MFR BLD: 5.1 % (ref 0–5.6)
HCT VFR BLD AUTO: 40.1 % (ref 35–47)
HDLC SERPL-MCNC: 79 MG/DL
HGB BLD-MCNC: 13.5 G/DL (ref 11.7–15.7)
HGB UR QL STRIP: ABNORMAL
KETONES UR STRIP-MCNC: NEGATIVE MG/DL
LDLC SERPL CALC-MCNC: 98 MG/DL
LEUKOCYTE ESTERASE UR QL STRIP: NEGATIVE
MCH RBC QN AUTO: 31.5 PG (ref 26.5–33)
MCHC RBC AUTO-ENTMCNC: 33.7 G/DL (ref 31.5–36.5)
MCV RBC AUTO: 94 FL (ref 78–100)
MUCOUS THREADS #/AREA URNS LPF: PRESENT /LPF
NITRATE UR QL: NEGATIVE
NONHDLC SERPL-MCNC: 122 MG/DL
PH UR STRIP: 7 PH (ref 5–7)
PLATELET # BLD AUTO: 317 10E9/L (ref 150–450)
POTASSIUM SERPL-SCNC: 3.9 MMOL/L (ref 3.4–5.3)
PROT SERPL-MCNC: 7.5 G/DL (ref 6.8–8.8)
RBC # BLD AUTO: 4.28 10E12/L (ref 3.8–5.2)
RBC #/AREA URNS AUTO: 1 /HPF (ref 0–2)
SODIUM SERPL-SCNC: 136 MMOL/L (ref 133–144)
SOURCE: ABNORMAL
SP GR UR STRIP: 1 (ref 1–1.03)
SQUAMOUS #/AREA URNS AUTO: 3 /HPF (ref 0–1)
TRIGL SERPL-MCNC: 119 MG/DL
TSH SERPL DL<=0.005 MIU/L-ACNC: 1.8 MU/L (ref 0.4–4)
UROBILINOGEN UR STRIP-MCNC: 0 MG/DL (ref 0–2)
WBC # BLD AUTO: 7.6 10E9/L (ref 4–11)
WBC #/AREA URNS AUTO: 2 /HPF (ref 0–5)

## 2019-11-19 RX ORDER — FLUTICASONE PROPIONATE 110 UG/1
2 AEROSOL, METERED RESPIRATORY (INHALATION) 2 TIMES DAILY
Qty: 1 INHALER | Refills: 0 | Status: SHIPPED | OUTPATIENT
Start: 2019-11-19 | End: 2019-12-31

## 2019-11-19 RX ORDER — ALBUTEROL SULFATE 90 UG/1
2 AEROSOL, METERED RESPIRATORY (INHALATION) EVERY 6 HOURS PRN
Qty: 18 G | Refills: 0 | Status: SHIPPED | OUTPATIENT
Start: 2019-11-19 | End: 2019-12-31

## 2019-11-19 ASSESSMENT — ANXIETY QUESTIONNAIRES
7. FEELING AFRAID AS IF SOMETHING AWFUL MIGHT HAPPEN: SEVERAL DAYS
2. NOT BEING ABLE TO STOP OR CONTROL WORRYING: SEVERAL DAYS
1. FEELING NERVOUS, ANXIOUS, OR ON EDGE: SEVERAL DAYS
6. BECOMING EASILY ANNOYED OR IRRITABLE: NOT AT ALL
3. WORRYING TOO MUCH ABOUT DIFFERENT THINGS: NOT AT ALL
GAD7 TOTAL SCORE: 4
5. BEING SO RESTLESS THAT IT IS HARD TO SIT STILL: NOT AT ALL

## 2019-11-19 ASSESSMENT — PAIN SCALES - GENERAL: PAINLEVEL: NO PAIN (0)

## 2019-11-19 ASSESSMENT — PATIENT HEALTH QUESTIONNAIRE - PHQ9
5. POOR APPETITE OR OVEREATING: SEVERAL DAYS
SUM OF ALL RESPONSES TO PHQ QUESTIONS 1-9: 2

## 2019-11-19 NOTE — PATIENT INSTRUCTIONS

## 2019-11-19 NOTE — NURSING NOTE
49 year old  Chief Complaint   Patient presents with     Establish Care     Pt is here to establish care.     Head Injury     Pt is here to follow up on concussion       Blood pressure 126/84, pulse 58, SpO2 97 %, not currently breastfeeding. There is no height or weight on file to calculate BMI.  BP completed using cuff size:      TIM Mancuso  November 19, 2019 8:35 AM

## 2019-11-20 LAB — DEPRECATED CALCIDIOL+CALCIFEROL SERPL-MC: 41 UG/L (ref 20–75)

## 2019-11-20 ASSESSMENT — ANXIETY QUESTIONNAIRES: GAD7 TOTAL SCORE: 4

## 2019-11-22 ENCOUNTER — THERAPY VISIT (OUTPATIENT)
Dept: PHYSICAL THERAPY | Facility: CLINIC | Age: 49
End: 2019-11-22
Payer: COMMERCIAL

## 2019-11-22 DIAGNOSIS — M54.2 CERVICALGIA: ICD-10-CM

## 2019-11-22 PROCEDURE — 97110 THERAPEUTIC EXERCISES: CPT | Mod: GP | Performed by: PHYSICAL THERAPIST

## 2019-11-22 PROCEDURE — 97140 MANUAL THERAPY 1/> REGIONS: CPT | Mod: GP | Performed by: PHYSICAL THERAPIST

## 2019-11-22 PROCEDURE — 97161 PT EVAL LOW COMPLEX 20 MIN: CPT | Mod: GP | Performed by: PHYSICAL THERAPIST

## 2019-11-22 NOTE — PROGRESS NOTES
[facility] Initial Evaluation  Subjective:    Type of problem:  Cervical spine   Condition occurred with:  A fall/slip. This is a recurrent condition   Problem details: MD order 11/19/19. Hannah mentions had increased dizzy episodes and head felt like it was on fire. She is very sore in her neck and she went to chiropractor and underwent laser therapy, which helped, she is still getting adjusted which she is not sure whether it is making the pain worse. Headache is more on and off. She wants to address the pain and headache. She was sent to PT for further manamgent as it helped her few months back. .   Patient reports pain:  Cervical right side, cervical left side, lower cervical spine and mid cervical spine (R>L). Radiates to:  Head. Associated symptoms:  Headache, loss of motion/stiffness and loss of strength. Symptoms are exacerbated by looking up or down Relieved by: aleve.    Hannah Sung being seen for neck pain.   Date of Onset: few months since last spring. HPI: Documentation: fall.  and reported as 5/10 on pain scale. General health as reported by patient is fair. Pertinent medical history includes:  Asthma.    Surgeries include:  None.  Current medications:  Sleep medication.   Primary job tasks include:  Computer work, prolonged sitting and prolonged standing.  Pain is described as aching and is intermittent. Pain is worse during the day. Since onset symptoms are unchanged. Special tests:  Other. Previous treatment includes physical therapy and chiropractic. There was mild improvement following previous treatment.   Patient is [place], IT. Restrictions include:  Working in normal job without restrictions.    Barriers include:  None as reported by patient.  Red flags:  None as reported by patient.                      Objective:        Flexibility/Screens:         Spine:  Decreased left spine flexibility:  Upper Trap and Levator    Decreased right spine flexibility:  Upper Trap and  Levator                  Cervical/Thoracic Evaluation    AROM:  AROM Cervical:    Flexion:          WFL end range soreness  Extension:       50% limted due to pain  Rotation:         Left:     Right:  Side Bend:      Left: end range stretch pain     Right:  End range stretch pain      Headaches: cervical  Cervical Myotomes:  normal                  DTR's:  normal          Cervical Dermatomes:  normal                    Cervical Palpation:    Tenderness present at Left:    Rhomboids; Upper Trap; Levator; Erector Spinae and Suboccipitals  Tenderness present at Right:    Rhomboids; Upper Trap; Levator; Erector Spinae and Suboccipitals        Cord Sign:  normal                                            Keri Cervical Evaluation    Posture:  Sitting: fair  Standing: fair    Wry Neck: no  Correction of Posture: better          Conclusion: posture and dysfunction  Principle of Treatment:  Posture Correction: sitting with neck support advsied, pillow support while sleeping educated  Flexion: avoided  Extension: encouraged                                             ROS    Assessment/Plan:    Patient is a 49 year old female with cervical complaints.    Patient has the following significant findings with corresponding treatment plan.                Diagnosis 1:  Cervicalgia  Pain -  hot/cold therapy, US, electric stimulation, mechanical traction, manual therapy, STS, self management, education, directional preference exercise and home program  Decreased ROM/flexibility - manual therapy, therapeutic exercise, therapeutic activity and home program  Decreased strength - therapeutic exercise, therapeutic activities and home program  Decreased function - therapeutic activities and home program    Therapy Evaluation Codes:   1) History comprised of:   Personal factors that impact the plan of care:      Time since onset of symptoms.    Comorbidity factors that impact the plan of care are:      check HPI.     Medications  impacting care: Check HPI.  2) Examination of Body Systems comprised of:   Body structures and functions that impact the plan of care:      Cervical spine.   Activity limitations that impact the plan of care are:      Bathing, Driving, Dressing, Lifting and Reading/Computer work.  3) Clinical presentation characteristics are:   Stable/Uncomplicated.  4) Decision-Making    Low complexity using standardized patient assessment instrument and/or measureable assessment of functional outcome.  Cumulative Therapy Evaluation is: Low complexity.    Previous and current functional limitations:  (See Goal Flow Sheet for this information)    Short term and Long term goals: (See Goal Flow Sheet for this information)     Communication ability:  Patient appears to be able to clearly communicate and understand verbal and written communication and follow directions correctly.  Treatment Explanation - The following has been discussed with the patient:   RX ordered/plan of care  Anticipated outcomes  Possible risks and side effects  This patient would benefit from PT intervention to resume normal activities.   Rehab potential is good.    Frequency:  1 X week, once daily  Duration:  for 6 weeks  Discharge Plan:  Achieve all LTG.  Independent in home treatment program.  Reach maximal therapeutic benefit.    Please refer to the daily flowsheet for treatment today, total treatment time and time spent performing 1:1 timed codes.

## 2019-11-26 NOTE — TELEPHONE ENCOUNTER
Panel Management Review  Summary:    Type of outreach:    Transferred care    Encounter routed to No Action Needed.                                                                                                                               Monica Lewis,

## 2019-11-29 ENCOUNTER — THERAPY VISIT (OUTPATIENT)
Dept: PHYSICAL THERAPY | Facility: CLINIC | Age: 49
End: 2019-11-29
Payer: COMMERCIAL

## 2019-11-29 DIAGNOSIS — M54.2 CERVICALGIA: ICD-10-CM

## 2019-11-29 PROCEDURE — 97110 THERAPEUTIC EXERCISES: CPT | Mod: GP | Performed by: PHYSICAL THERAPIST

## 2019-11-29 PROCEDURE — 97140 MANUAL THERAPY 1/> REGIONS: CPT | Mod: GP | Performed by: PHYSICAL THERAPIST

## 2019-12-05 ENCOUNTER — THERAPY VISIT (OUTPATIENT)
Dept: PHYSICAL THERAPY | Facility: CLINIC | Age: 49
End: 2019-12-05
Payer: COMMERCIAL

## 2019-12-05 DIAGNOSIS — M54.2 CERVICALGIA: ICD-10-CM

## 2019-12-05 PROCEDURE — 97140 MANUAL THERAPY 1/> REGIONS: CPT | Mod: GP | Performed by: PHYSICAL THERAPIST

## 2019-12-05 PROCEDURE — 97110 THERAPEUTIC EXERCISES: CPT | Mod: GP | Performed by: PHYSICAL THERAPIST

## 2019-12-12 ENCOUNTER — THERAPY VISIT (OUTPATIENT)
Dept: PHYSICAL THERAPY | Facility: CLINIC | Age: 49
End: 2019-12-12
Payer: COMMERCIAL

## 2019-12-12 DIAGNOSIS — M54.2 CERVICALGIA: ICD-10-CM

## 2019-12-12 PROCEDURE — 97110 THERAPEUTIC EXERCISES: CPT | Mod: GP | Performed by: PHYSICAL THERAPIST

## 2019-12-12 PROCEDURE — 97140 MANUAL THERAPY 1/> REGIONS: CPT | Mod: GP | Performed by: PHYSICAL THERAPIST

## 2019-12-20 ENCOUNTER — THERAPY VISIT (OUTPATIENT)
Dept: PHYSICAL THERAPY | Facility: CLINIC | Age: 49
End: 2019-12-20
Payer: COMMERCIAL

## 2019-12-20 DIAGNOSIS — M54.2 CERVICALGIA: ICD-10-CM

## 2019-12-20 PROCEDURE — 97140 MANUAL THERAPY 1/> REGIONS: CPT | Mod: GP | Performed by: PHYSICAL THERAPIST

## 2019-12-20 PROCEDURE — 97110 THERAPEUTIC EXERCISES: CPT | Mod: GP | Performed by: PHYSICAL THERAPIST

## 2019-12-26 ENCOUNTER — ANCILLARY PROCEDURE (OUTPATIENT)
Dept: MAMMOGRAPHY | Facility: CLINIC | Age: 49
End: 2019-12-26
Attending: NURSE PRACTITIONER
Payer: COMMERCIAL

## 2019-12-26 DIAGNOSIS — Z12.31 VISIT FOR SCREENING MAMMOGRAM: ICD-10-CM

## 2019-12-31 ENCOUNTER — OFFICE VISIT (OUTPATIENT)
Dept: FAMILY MEDICINE | Facility: CLINIC | Age: 49
End: 2019-12-31
Payer: COMMERCIAL

## 2019-12-31 VITALS
SYSTOLIC BLOOD PRESSURE: 132 MMHG | HEART RATE: 80 BPM | OXYGEN SATURATION: 98 % | WEIGHT: 181.2 LBS | HEIGHT: 65 IN | TEMPERATURE: 97.4 F | DIASTOLIC BLOOD PRESSURE: 84 MMHG | BODY MASS INDEX: 30.19 KG/M2

## 2019-12-31 DIAGNOSIS — T78.40XS ALLERGIC REACTION, SEQUELA: ICD-10-CM

## 2019-12-31 DIAGNOSIS — R31.29 MICROSCOPIC HEMATURIA: ICD-10-CM

## 2019-12-31 DIAGNOSIS — F41.9 ANXIETY: ICD-10-CM

## 2019-12-31 DIAGNOSIS — F51.01 PRIMARY INSOMNIA: ICD-10-CM

## 2019-12-31 DIAGNOSIS — J45.30 MILD PERSISTENT ASTHMA WITHOUT COMPLICATION: ICD-10-CM

## 2019-12-31 DIAGNOSIS — Z00.00 ENCOUNTER FOR WELLNESS EXAMINATION: Primary | ICD-10-CM

## 2019-12-31 LAB
ALBUMIN UR-MCNC: NEGATIVE MG/DL
APPEARANCE UR: ABNORMAL
BACTERIA #/AREA URNS HPF: ABNORMAL /HPF
BILIRUB UR QL STRIP: NEGATIVE
COLOR UR AUTO: YELLOW
GLUCOSE UR STRIP-MCNC: NEGATIVE MG/DL
HGB UR QL STRIP: NEGATIVE
KETONES UR STRIP-MCNC: 5 MG/DL
LEUKOCYTE ESTERASE UR QL STRIP: NEGATIVE
MUCOUS THREADS #/AREA URNS LPF: PRESENT /LPF
NITRATE UR QL: NEGATIVE
PH UR STRIP: 6 PH (ref 5–7)
RBC #/AREA URNS AUTO: 2 /HPF (ref 0–2)
SOURCE: ABNORMAL
SP GR UR STRIP: 1.02 (ref 1–1.03)
SQUAMOUS #/AREA URNS AUTO: 3 /HPF (ref 0–1)
UROBILINOGEN UR STRIP-MCNC: 0 MG/DL (ref 0–2)
WBC #/AREA URNS AUTO: 2 /HPF (ref 0–5)

## 2019-12-31 RX ORDER — ALBUTEROL SULFATE 90 UG/1
2 AEROSOL, METERED RESPIRATORY (INHALATION) EVERY 6 HOURS PRN
Qty: 18 G | Refills: 0 | Status: SHIPPED | OUTPATIENT
Start: 2019-12-31 | End: 2020-07-27

## 2019-12-31 RX ORDER — ALPRAZOLAM 0.5 MG
0.5 TABLET ORAL DAILY PRN
Qty: 30 TABLET | Refills: 0 | Status: SHIPPED | OUTPATIENT
Start: 2019-12-31 | End: 2021-08-05

## 2019-12-31 RX ORDER — FLUTICASONE PROPIONATE 110 UG/1
2 AEROSOL, METERED RESPIRATORY (INHALATION) 2 TIMES DAILY
Qty: 1 INHALER | Refills: 0 | Status: SHIPPED | OUTPATIENT
Start: 2019-12-31 | End: 2020-07-27

## 2019-12-31 RX ORDER — PROPRANOLOL HYDROCHLORIDE 20 MG/1
20 TABLET ORAL 2 TIMES DAILY PRN
Qty: 30 TABLET | Refills: 1 | Status: SHIPPED | OUTPATIENT
Start: 2019-12-31

## 2019-12-31 RX ORDER — EPINEPHRINE 0.3 MG/.3ML
0.3 INJECTION SUBCUTANEOUS PRN
Qty: 1 EACH | Refills: 3 | Status: SHIPPED | OUTPATIENT
Start: 2019-12-31

## 2019-12-31 ASSESSMENT — MIFFLIN-ST. JEOR: SCORE: 1429.86

## 2019-12-31 ASSESSMENT — PAIN SCALES - GENERAL: PAINLEVEL: NO PAIN (0)

## 2019-12-31 NOTE — PATIENT INSTRUCTIONS
Anxiety:  Try propranolol first before Xanax. GAD7 and PhQ9 next visit.  Asthma:  Return to using Flovent inhaler once daily or every other day if that's sufficient. If using Albuterol inhaler more than 2X weekly, need increased flovent use.  Insomnia: Try melatonin 5mg at bedtime. Good sleep hygiene practices.  Microscopic Hematuria:  Repeat UA today.  Follow-up through MyChart  Refer Allergy testing  Wellness:  Continue good exercise practices. Decrease alcohol use, saturated fats, sweets, and watch calories or try Whole 30 for weight loss and to improve cholesterol  Pap/pelvic is due April 2020  Shingles vaccine series in the near future.  Will schedule her own Derm appointment for skin check.  Vitamin D3 500- 1000mg daily.  Calcium 1200 mg daily including dietary sources      [facility] Medication Refill Request Information:  * Please contact your pharmacy regarding ANY request for medication refills.  ** NP Clinic Prescription Fax = [phone]  * Please allow 3 business days for routine medication refills.  * Please allow 5 business days for controlled substance medication refills.     [facility] Test Result notification information:  *You will be notified with in 7-10 days of your appointment day regarding the results of your test.  If you are on MyChart you will be notified as soon as the provider has reviewed the results and signed off on them.    [facility]: [phone]       Please follow-up for new symptom development, worsening, or no improvement.    Thank you for allowing me to participate in your care.      Janna Lazaro, ANP, AGACNP  Nurse Practitioner

## 2019-12-31 NOTE — NURSING NOTE
"49 year old  Chief Complaint   Patient presents with     Physical     Pt is here for a physical.        Blood pressure 132/84, pulse 80, temperature 97.4  F (36.3  C), height 1.638 m (5' 4.5\"), weight 82.2 kg (181 lb 3.2 oz), SpO2 98 %, not currently breastfeeding. Body mass index is 30.62 kg/m .  BP completed using cuff size:      TIM Mancuso  December 31, 2019 8:17 AM  "
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
## 2020-01-02 NOTE — RESULT ENCOUNTER NOTE
Aura Young,  Here are the results of your urinary analysis.  It is quite similar to the last UA, continuing to show some bacteria which is most likely contamination.  Since you are not having symptoms I am comfortable with these results.    Best regards,  Janna Lazaro NP

## 2020-01-02 NOTE — TELEPHONE ENCOUNTER
FUTURE VISIT INFORMATION      FUTURE VISIT INFORMATION:    Date: 1.27.20    Time: 8:40    Location: [facility]  REFERRAL INFORMATION:    Referring provider:  Janna Lazaro NP    Referring providers clinic:  [facility]    Reason for visit/diagnosis  Allergic reaction, sequela    RECORDS REQUESTED FROM:       Clinic name Comments Records Status Imaging Status   [facility] 12.31.19, 11.19.19, 7.23.19 Janna Lazaro [facility] N/A

## 2020-01-16 ENCOUNTER — OFFICE VISIT (OUTPATIENT)
Dept: FAMILY MEDICINE | Facility: CLINIC | Age: 50
End: 2020-01-16
Payer: COMMERCIAL

## 2020-01-16 VITALS — DIASTOLIC BLOOD PRESSURE: 72 MMHG | SYSTOLIC BLOOD PRESSURE: 122 MMHG

## 2020-01-16 DIAGNOSIS — Z80.8 FAMILY HX OF MELANOMA: ICD-10-CM

## 2020-01-16 DIAGNOSIS — D48.5 NEOPLASM OF UNCERTAIN BEHAVIOR OF SKIN: Primary | ICD-10-CM

## 2020-01-16 DIAGNOSIS — L82.1 SEBORRHEIC KERATOSES: ICD-10-CM

## 2020-01-16 DIAGNOSIS — D22.9 MULTIPLE BENIGN NEVI: ICD-10-CM

## 2020-01-16 DIAGNOSIS — L91.8 INFLAMED SKIN TAG: ICD-10-CM

## 2020-01-16 DIAGNOSIS — D18.01 CHERRY ANGIOMA: ICD-10-CM

## 2020-01-16 DIAGNOSIS — Z80.8 FAMILY HISTORY OF NONMELANOMA SKIN CANCER: ICD-10-CM

## 2020-01-16 DIAGNOSIS — L81.4 LENTIGINES: ICD-10-CM

## 2020-01-16 PROBLEM — M54.2 CERVICALGIA: Status: RESOLVED | Noted: 2019-11-22 | Resolved: 2020-01-16

## 2020-01-16 PROCEDURE — 99214 OFFICE O/P EST MOD 30 MIN: CPT | Mod: 25 | Performed by: PHYSICIAN ASSISTANT

## 2020-01-16 PROCEDURE — 11102 TANGNTL BX SKIN SINGLE LES: CPT | Performed by: PHYSICIAN ASSISTANT

## 2020-01-16 PROCEDURE — 88305 TISSUE EXAM BY PATHOLOGIST: CPT | Mod: TC | Performed by: PHYSICIAN ASSISTANT

## 2020-01-16 PROCEDURE — 88342 IMHCHEM/IMCYTCHM 1ST ANTB: CPT | Mod: TC | Performed by: PHYSICIAN ASSISTANT

## 2020-01-16 PROCEDURE — 11200 RMVL SKIN TAGS UP TO&INC 15: CPT | Mod: 59 | Performed by: PHYSICIAN ASSISTANT

## 2020-01-16 PROCEDURE — 11103 TANGNTL BX SKIN EA SEP/ADDL: CPT | Performed by: PHYSICIAN ASSISTANT

## 2020-01-16 NOTE — PROGRESS NOTES
HPI:  Hannah Sung is a 49 year old female patient here today for painful spot on right thigh .  Patient states this has been present for a while.  Patient reports the following symptoms: scaly and painful .  Patient reports the following previous treatments: none.  Patient reports the following modifying factors: none.  Associated symptoms: none. Also has a new spot on chest x 2-3 years. Dark. No tx tried. No sx..  Patient has no other skin complaints today.  Remainder of the HPI, Meds, PMH, Allergies, FH, and SH was reviewed in chart.    Pertinent Hx:   No personal  history of skin cancer. Mother had a BCC.maternal grandfather had Melanoma.    Past Medical History:   Diagnosis Date     Allergy      Anxiety        No past surgical history on file.     Family History   Problem Relation Age of Onset     Cancer Mother         Skin - basal      Cancer Maternal Grandfather         Multiple Myeloma/melanoma     Migraines Father      Migraines Sister      Migraines Daughter      Migraines Sister      Migraines Sister      Breast Cancer Other      Other Cancer Other         liver cancer     Glaucoma No family hx of      Macular Degeneration No family hx of      Diabetes No family hx of      Hypertension No family hx of        Social History     Socioeconomic History     Marital status: Single     Spouse name: Not on file     Number of children: Not on file     Years of education: Not on file     Highest education level: Not on file   Occupational History     Not on file   Social Needs     Financial resource strain: Not on file     Food insecurity:     Worry: Not on file     Inability: Not on file     Transportation needs:     Medical: Not on file     Non-medical: Not on file   Tobacco Use     Smoking status: Never Smoker     Smokeless tobacco: Never Used   Substance and Sexual Activity     Alcohol use: Yes     Comment: wine occasionally     Drug use: No     Sexual activity: Yes     Partners: Male     Birth  control/protection: I.U.D.   Lifestyle     Physical activity:     Days per week: Not on file     Minutes per session: Not on file     Stress: Not on file   Relationships     Social connections:     Talks on phone: Not on file     Gets together: Not on file     Attends Mandaeism service: Not on file     Active member of club or organization: Not on file     Attends meetings of clubs or organizations: Not on file     Relationship status: Not on file     Intimate partner violence:     Fear of current or ex partner: Not on file     Emotionally abused: Not on file     Physically abused: Not on file     Forced sexual activity: Not on file   Other Topics Concern     Parent/sibling w/ CABG, MI or angioplasty before 65F 55M? No   Social History Narrative     Not on file       Outpatient Encounter Medications as of 1/16/2020   Medication Sig Dispense Refill     albuterol (PROAIR HFA/PROVENTIL HFA/VENTOLIN HFA) 108 (90 Base) MCG/ACT inhaler Inhale 2 puffs into the lungs every 6 hours as needed for shortness of breath / dyspnea or wheezing 18 g 0     albuterol (PROVENTIL) (5 MG/ML) 0.5% neb solution Take 0.5 mLs (2.5 mg) by nebulization every 6 hours as needed 30 vial 3     ALPRAZolam (XANAX) 0.5 MG tablet Take 1 tablet (0.5 mg) by mouth daily as needed for anxiety 30 tablet 0     cetirizine (ZYRTEC) 10 MG tablet Take 1 tablet (10 mg) by mouth daily as needed for allergies 30 tablet 2     EPINEPHrine (EPIPEN/ADRENACLICK/OR ANY BX GENERIC EQUIV) 0.3 MG/0.3ML injection 2-pack Inject 0.3 mLs (0.3 mg) into the muscle as needed for anaphylaxis 1 each 3     fluticasone (FLONASE) 50 MCG/ACT nasal spray Spray 2 sprays into both nostrils daily 16 g 3     fluticasone (FLOVENT HFA) 110 MCG/ACT inhaler Inhale 2 puffs into the lungs 2 times daily 1 Inhaler 0     levonorgestrel (MIRENA) 20 MCG/24HR IUD 1 each by Intrauterine route once 04/2012       naproxen (NAPROSYN) 500 MG tablet Take 1 tablet (500 mg) by mouth 2 times daily (with meals)  30 tablet 1     naproxen sodium 220 MG capsule Take 440 mg by mouth 2 times daily (with meals)       olopatadine (PATANOL) 0.1 % ophthalmic solution Place 1 drop into both eyes 2 times daily 1 Bottle 3     order for DME Nebulizer 1 Device 0     propranolol (INDERAL) 20 MG tablet Take 1 tablet (20 mg) by mouth 2 times daily as needed (For anxiety) 30 tablet 1     tiZANidine (ZANAFLEX) 2 MG tablet Take 1 tablet (2 mg) by mouth nightly as needed for muscle spasms 20 tablet 1     traZODone (DESYREL) 50 MG tablet Take 0.5-1 tablets (25-50 mg) by mouth At Bedtime 30 tablet 1     triamcinolone (KENALOG) 0.5 % cream Apply sparingly to affected area three times daily. 30 g 0     No facility-administered encounter medications on file as of 1/16/2020.        Review Of Systems:  Skin: spot on thigh and chest  Eyes: negative  Ears/Nose/Throat: negative  Respiratory: No shortness of breath, dyspnea on exertion, cough, or hemoptysis  Cardiovascular: negative  Gastrointestinal: negative  Genitourinary: negative  Musculoskeletal: negative  Neurologic: negative  Psychiatric: negative  Hematologic/Lymphatic/Immunologic: negative  Endocrine: negative      Objective:     /72   Eyes: Conjunctivae/lids: Normal   ENT: Lips:  Normal  MSK: Normal  Cardiovascular: Peripheral edema none  Pulm: Breathing Normal  Neuro/Psych: Orientation: A/O x 3. Normal; Mood/Affect: Normal, NAD, WDWN  Pt accompanied by: self  Following areas examined: Scalp, face, eyelids, lips, neck, chest, abdomen, back,  and R&L upper and lower extremities. Pt defers exam of buttock, hips, groin and genitals.   Garcia skin type:ii   Findings:  Red smooth well-defined macules on trunk and extremities.  Brown, stuck-on scaly appearing papules on trunk and extremities.  Well circumscribed macules with symmetric color distribution on trunk and extremities.  Tan WD smooth macules on face, neck, trunk, and extremities.  Smooth brown macule with irregular borders on mid  chest 0.3cm  Brown scaly papule on right posterior thigh 0.4cm  Inflamed flesh-colored smooth pedunculated papules on right neck x 3    Assessment and Plan:     1) Cherry angiomas, Seborrheic keratoses, Benign nevi, Lentigines     I discussed the specifics of tumor, prognosis, and genetics of benign lesions.  I explained that treatment of these lesions would be purely cosmetic and not medically neccessary.  I discussed with patient different removal options including excision, cryotherapy, cautery and /or laser.  Lesion may recur and/or may not completely resolve. May need additional treatment.     2) Neoplasm of uncertain behavior mid chest 0.3cm  Lentigo vs MIS  TANGENTIAL BIOPSY:  After consent, anesthesia with LEC and prep, tangential biopsy performed.  No complications and routine wound care.  May grow back and will get a scar. Based on lesion type may need to completely remove lesion. Patient will be notified in 7-10 days of results. Wound care directions given.    3) Neoplasm of uncertain behavior on right posterior thigh 0.3cm  isk vs scc  TANGENTIAL BIOPSY:  After consent, anesthesia with LEC and prep, tangential biopsy performed.  No complications and routine wound care.  May grow back and will get a scar. Based on lesion type may need to completely remove lesion. Patient will be notified in 7-10 days of results. Wound care directions given.    4) irritated skin tags x 3   Benign etiology and course of lesion.  Treatment of these lesions may be be purely cosmetic and not medically neccessary.  Lesion may recur and/or may not completely resolve. May need additional treatment.  Different removal options including excision, cryotherapy, cautery and /or laser.    LN2: Treated with LN2 for 5s for 1-2 cycles. Warned risks of blistering, pain, pigment change, scarring, and incomplete resolution.  Advised patient to return if lesions do not completely resolve within 2-3 months.  Wound care sheet given.    5)family  hx of MM and NMSC  Signs and Symptoms of non-melanoma skin cancer and ABCDEs of melanoma reviewed with patient. Patient encouraged to perform monthly self skin exams and educated on how to perform them. UV precautions reviewed with patient. Patient was asked about new or changing moles/lesions on body.   Wear a sunscreen with at least SPF 30 on your face, ears, neck and V of the chest daily. Wear sunscreen on other areas of the body if those areas are exposed to the sun throughout the day. Sunscreens can contain physical and/or chemical blockers. Physical blockers are less likely to clog pores, these include zinc oxide and titanium dioxide. Reapply every two hour and after swimming. Sunscreen examples include Neutrogena, CeraVe, Blue Lizard, Elta MD and many others.    Proper skin care from [facility]:    -Eliminate harsh soaps as they strip the natural oils from the skin, often resulting in dry itchy skin ( i.e. Dial, Zest, [place] Spring)  -Use mild soaps such as Cetaphil or Dove Sensitive Skin in the shower. You do not need to use soap on arms, legs, and trunk every time you shower unless visibly soiled.   -Avoid hot or cold showers.  -After showering, lightly dry off and apply moisturizing within 2-3 minutes. This will help trap moisture in the skin.   -Aggressive use of a moisturizer at least 1-2 times a day to the entire body (including -Vanicream, Cetaphil, Aquaphor or Cerave) and moisturize hands after every washing.  -We recommend using moisturizers that come in a tub that needs to be scooped out, not a pump. This has more of an oil base. It will hold moisture in your skin much better than a water base moisturizer. The above recommended are non-pore clogging.               Follow up in yearly FBE

## 2020-01-16 NOTE — LETTER
1/16/2020         RE: Hannah Sung  [address]  [facility] [address]        Dear Colleague,    Thank you for referring your patient, Hannah Sung, to [facility] [place] [facility]. Please see a copy of my visit note below.    HPI:  Hannah Sung is a 49 year old female patient here today for painful spot on right thigh .  Patient states this has been present for a while.  Patient reports the following symptoms: scaly and painful .  Patient reports the following previous treatments: none.  Patient reports the following modifying factors: none.  Associated symptoms: none. Also has a new spot on chest x 2-3 years. Dark. No tx tried. No sx..  Patient has no other skin complaints today.  Remainder of the HPI, Meds, PMH, Allergies, FH, and SH was reviewed in chart.    Pertinent Hx:   No personal  history of skin cancer. Mother had a BCC.maternal grandfather had Melanoma.    Past Medical History:   Diagnosis Date     Allergy      Anxiety        No past surgical history on file.     Family History   Problem Relation Age of Onset     Cancer Mother         Skin - basal      Cancer Maternal Grandfather         Multiple Myeloma/melanoma     Migraines Father      Migraines Sister      Migraines Daughter      Migraines Sister      Migraines Sister      Breast Cancer Other      Other Cancer Other         liver cancer     Glaucoma No family hx of      Macular Degeneration No family hx of      Diabetes No family hx of      Hypertension No family hx of        Social History     Socioeconomic History     Marital status: Single     Spouse name: Not on file     Number of children: Not on file     Years of education: Not on file     Highest education level: Not on file   Occupational History     Not on file   Social Needs     Financial resource strain: Not on file     Food insecurity:     Worry: Not on file     Inability: Not on file     Transportation needs:     Medical: Not on file     Non-medical: Not on file    Tobacco Use     Smoking status: Never Smoker     Smokeless tobacco: Never Used   Substance and Sexual Activity     Alcohol use: Yes     Comment: wine occasionally     Drug use: No     Sexual activity: Yes     Partners: Male     Birth control/protection: I.U.D.   Lifestyle     Physical activity:     Days per week: Not on file     Minutes per session: Not on file     Stress: Not on file   Relationships     Social connections:     Talks on phone: Not on file     Gets together: Not on file     Attends Spiritism service: Not on file     Active member of club or organization: Not on file     Attends meetings of clubs or organizations: Not on file     Relationship status: Not on file     Intimate partner violence:     Fear of current or ex partner: Not on file     Emotionally abused: Not on file     Physically abused: Not on file     Forced sexual activity: Not on file   Other Topics Concern     Parent/sibling w/ CABG, MI or angioplasty before 65F 55M? No   Social History Narrative     Not on file       Outpatient Encounter Medications as of 1/16/2020   Medication Sig Dispense Refill     albuterol (PROAIR HFA/PROVENTIL HFA/VENTOLIN HFA) 108 (90 Base) MCG/ACT inhaler Inhale 2 puffs into the lungs every 6 hours as needed for shortness of breath / dyspnea or wheezing 18 g 0     albuterol (PROVENTIL) (5 MG/ML) 0.5% neb solution Take 0.5 mLs (2.5 mg) by nebulization every 6 hours as needed 30 vial 3     ALPRAZolam (XANAX) 0.5 MG tablet Take 1 tablet (0.5 mg) by mouth daily as needed for anxiety 30 tablet 0     cetirizine (ZYRTEC) 10 MG tablet Take 1 tablet (10 mg) by mouth daily as needed for allergies 30 tablet 2     EPINEPHrine (EPIPEN/ADRENACLICK/OR ANY BX GENERIC EQUIV) 0.3 MG/0.3ML injection 2-pack Inject 0.3 mLs (0.3 mg) into the muscle as needed for anaphylaxis 1 each 3     fluticasone (FLONASE) 50 MCG/ACT nasal spray Spray 2 sprays into both nostrils daily 16 g 3     fluticasone (FLOVENT HFA) 110 MCG/ACT inhaler  Inhale 2 puffs into the lungs 2 times daily 1 Inhaler 0     levonorgestrel (MIRENA) 20 MCG/24HR IUD 1 each by Intrauterine route once 04/2012       naproxen (NAPROSYN) 500 MG tablet Take 1 tablet (500 mg) by mouth 2 times daily (with meals) 30 tablet 1     naproxen sodium 220 MG capsule Take 440 mg by mouth 2 times daily (with meals)       olopatadine (PATANOL) 0.1 % ophthalmic solution Place 1 drop into both eyes 2 times daily 1 Bottle 3     order for DME Nebulizer 1 Device 0     propranolol (INDERAL) 20 MG tablet Take 1 tablet (20 mg) by mouth 2 times daily as needed (For anxiety) 30 tablet 1     tiZANidine (ZANAFLEX) 2 MG tablet Take 1 tablet (2 mg) by mouth nightly as needed for muscle spasms 20 tablet 1     traZODone (DESYREL) 50 MG tablet Take 0.5-1 tablets (25-50 mg) by mouth At Bedtime 30 tablet 1     triamcinolone (KENALOG) 0.5 % cream Apply sparingly to affected area three times daily. 30 g 0     No facility-administered encounter medications on file as of 1/16/2020.        Review Of Systems:  Skin: spot on thigh and chest  Eyes: negative  Ears/Nose/Throat: negative  Respiratory: No shortness of breath, dyspnea on exertion, cough, or hemoptysis  Cardiovascular: negative  Gastrointestinal: negative  Genitourinary: negative  Musculoskeletal: negative  Neurologic: negative  Psychiatric: negative  Hematologic/Lymphatic/Immunologic: negative  Endocrine: negative      Objective:     /72   Eyes: Conjunctivae/lids: Normal   ENT: Lips:  Normal  MSK: Normal  Cardiovascular: Peripheral edema none  Pulm: Breathing Normal  Neuro/Psych: Orientation: A/O x 3. Normal; Mood/Affect: Normal, NAD, WDWN  Pt accompanied by: self  Following areas examined: Scalp, face, eyelids, lips, neck, chest, abdomen, back,  and R&L upper and lower extremities. Pt defers exam of buttock, hips, groin and genitals.   Garcia skin type:ii   Findings:  Red smooth well-defined macules on trunk and extremities.  Brown, stuck-on scaly  appearing papules on trunk and extremities.  Well circumscribed macules with symmetric color distribution on trunk and extremities.  Tan WD smooth macules on face, neck, trunk, and extremities.  Smooth brown macule with irregular borders on mid chest 0.3cm  Brown scaly papule on right posterior thigh 0.4cm  Inflamed flesh-colored smooth pedunculated papules on right neck x 3    Assessment and Plan:     1) Cherry angiomas, Seborrheic keratoses, Benign nevi, Lentigines     I discussed the specifics of tumor, prognosis, and genetics of benign lesions.  I explained that treatment of these lesions would be purely cosmetic and not medically neccessary.  I discussed with patient different removal options including excision, cryotherapy, cautery and /or laser.  Lesion may recur and/or may not completely resolve. May need additional treatment.     2) Neoplasm of uncertain behavior mid chest 0.3cm  Lentigo vs MIS  TANGENTIAL BIOPSY:  After consent, anesthesia with LEC and prep, tangential biopsy performed.  No complications and routine wound care.  May grow back and will get a scar. Based on lesion type may need to completely remove lesion. Patient will be notified in 7-10 days of results. Wound care directions given.    3) Neoplasm of uncertain behavior on right posterior thigh 0.3cm  isk vs scc  TANGENTIAL BIOPSY:  After consent, anesthesia with LEC and prep, tangential biopsy performed.  No complications and routine wound care.  May grow back and will get a scar. Based on lesion type may need to completely remove lesion. Patient will be notified in 7-10 days of results. Wound care directions given.    4) irritated skin tags x 3   Benign etiology and course of lesion.  Treatment of these lesions may be be purely cosmetic and not medically neccessary.  Lesion may recur and/or may not completely resolve. May need additional treatment.  Different removal options including excision, cryotherapy, cautery and /or laser.    LN2:  Treated with LN2 for 5s for 1-2 cycles. Warned risks of blistering, pain, pigment change, scarring, and incomplete resolution.  Advised patient to return if lesions do not completely resolve within 2-3 months.  Wound care sheet given.    5)family hx of MM and NMSC  Signs and Symptoms of non-melanoma skin cancer and ABCDEs of melanoma reviewed with patient. Patient encouraged to perform monthly self skin exams and educated on how to perform them. UV precautions reviewed with patient. Patient was asked about new or changing moles/lesions on body.   Wear a sunscreen with at least SPF 30 on your face, ears, neck and V of the chest daily. Wear sunscreen on other areas of the body if those areas are exposed to the sun throughout the day. Sunscreens can contain physical and/or chemical blockers. Physical blockers are less likely to clog pores, these include zinc oxide and titanium dioxide. Reapply every two hour and after swimming. Sunscreen examples include Neutrogena, CeraVe, Blue Lizard, Elta MD and many others.    Proper skin care from [facility]:    -Eliminate harsh soaps as they strip the natural oils from the skin, often resulting in dry itchy skin ( i.e. Dial, Zest, [place] Spring)  -Use mild soaps such as Cetaphil or Dove Sensitive Skin in the shower. You do not need to use soap on arms, legs, and trunk every time you shower unless visibly soiled.   -Avoid hot or cold showers.  -After showering, lightly dry off and apply moisturizing within 2-3 minutes. This will help trap moisture in the skin.   -Aggressive use of a moisturizer at least 1-2 times a day to the entire body (including -Vanicream, Cetaphil, Aquaphor or Cerave) and moisturize hands after every washing.  -We recommend using moisturizers that come in a tub that needs to be scooped out, not a pump. This has more of an oil base. It will hold moisture in your skin much better than a water base moisturizer. The above recommended are non-pore  clogging.               Follow up in yearly FBE      Again, thank you for allowing me to participate in the care of your patient.        Sincerely,        Betty Thakur PA-C

## 2020-01-16 NOTE — PROGRESS NOTES
Discharge Note    Progress reporting period is from last progress note on   to Dec 20, 2019.    Hannah failed to follow up and current status is unknown.  Please see information below for last relevant information on current status.  Patient seen for 5 visits.    SUBJECTIVE  Subjective changes noted by patient:  Hannah mentions her pain is better but tightness at the end of the day present.   .  Current pain level is 2/10.     Previous pain level was   .   Changes in function:  Yes (See Goal flowsheet attached for changes in current functional level)  Adverse reaction to treatment or activity: None    OBJECTIVE  Changes noted in objective findings: Tenderness over suboccipital on the left side and upper trapezius     ASSESSMENT/PLAN  Diagnosis: Cervicalgia   Updated problem list and treatment plan:     STG/LTGs have been met or progress has been made towards goals:  Yes, please see goal flowsheet for most current information  Assessment of Progress: current status is unknown.    Last current status: Pt is progressing well   Self Management Plans:  HEP  I have re-evaluated this patient and find that the nature, scope, duration and intensity of the therapy is appropriate for the medical condition of the patient.  Hannah continues to require the following intervention to meet STG and LTG's:  HEP.    Recommendations:  Discharge with current home program.  Patient to follow up with MD as needed.    Please refer to the daily flowsheet for treatment today, total treatment time and time spent performing 1:1 timed codes.

## 2020-01-16 NOTE — PATIENT INSTRUCTIONS
Wound Care Instructions     FOR SUPERFICIAL WOUNDS     [facility] [phone]    [facility] [phone]          AFTER 24 HOURS YOU SHOULD REMOVE THE BANDAGE AND BEGIN DAILY DRESSING CHANGES AS FOLLOWS:     1) Remove Dressing.     2) Clean and dry the area with tap water using a Q-tip or sterile gauze pad.     3) Apply Vaseline, Aquaphor, Polysporin ointment or Bacitracin ointment over entire wound.  Do NOT use Neosporin ointment.     4) Cover the wound with a band-aid, or a sterile non-stick gauze pad and micropore paper tape      REPEAT THESE INSTRUCTIONS AT LEAST ONCE A DAY UNTIL THE WOUND HAS COMPLETELY HEALED.    It is an old wives tale that a wound heals better when it is exposed to air and allowed to dry out. The wound will heal faster with a better cosmetic result if it is kept moist with ointment and covered with a bandage.    **Do not let the wound dry out.**      Supplies Needed:      *Cotton tipped applicators (Q-tips)    *Polysporin Ointment or Bacitracin Ointment (NOT NEOSPORIN)    *Band-aids or non-stick gauze pads and micropore paper tape.      PATIENT INFORMATION:    During the healing process you will notice a number of changes. All wounds develop a small halo of redness surrounding the wound.  This means healing is occurring. Severe itching with extensive redness usually indicates sensitivity to the ointment or bandage tape used to dress the wound.  You should call our office if this develops.      Swelling  and/or discoloration around your surgical site is common, particularly when performed around the eye.    All wounds normally drain.  The larger the wound the more drainage there will be.  After 7-10 days, you will notice the wound beginning to shrink and new skin will begin to grow.  The wound is healed when you can see skin has formed over the entire area.  A healed wound has a healthy, shiny look to the surface and is red to dark pink in color to  normalize.  Wounds may take approximately 4-6 weeks to heal.  Larger wounds may take 6-8 weeks.  After the wound is healed you may discontinue dressing changes.    You may experience a sensation of tightness as your wound heals. This is normal and will gradually subside.    Your healed wound may be sensitive to temperature changes. This sensitivity improves with time, but if you re having a lot of discomfort, try to avoid temperature extremes.    Patients frequently experience itching after their wound appears to have healed because of the continue healing under the skin.  Plain Vaseline will help relieve the itching.        POSSIBLE COMPLICATIONS    BLEEDIN. Leave the bandage in place.  2. Use tightly rolled up gauze or a cloth to apply direct pressure over the bandage for 30  minutes.  3. Reapply pressure for an additional 30 minutes if necessary  4. Use additional gauze and tape to maintain pressure once the bleeding has stopped.    Proper skin care from [facility]:    -Eliminate harsh soaps as they strip the natural oils from the skin, often resulting in dry itchy skin ( i.e. Dial, Zest, [place] Spring)  -Use mild soaps such as Cetaphil or Dove Sensitive Skin in the shower. You do not need to use soap on arms, legs, and trunk every time you shower unless visibly soiled.   -Avoid hot or cold showers.  -After showering, lightly dry off and apply moisturizing within 2-3 minutes. This will help trap moisture in the skin.   -Aggressive use of a moisturizer at least 1-2 times a day to the entire body (including -Vanicream, Cetaphil, Aquaphor or Cerave) and moisturize hands after every washing.  -We recommend using moisturizers that come in a tub that needs to be scooped out, not a pump. This has more of an oil base. It will hold moisture in your skin much better than a water base moisturizer. The above recommended are non-pore clogging.      Wear a sunscreen with at least SPF 30 on your face, ears, neck  and V of the chest daily. Wear sunscreen on other areas of the body if those areas are exposed to the sun throughout the day. Sunscreens can contain physical and/or chemical blockers. Physical blockers are less likely to clog pores, these include zinc oxide and titanium dioxide. Reapply every two hour and after swimming. Sunscreen examples include Neutrogena, CeraVe, Blue Lizard, Elta MD and many others.    UV radiation  UVA radiation remains constant throughout the day and throughout the year. It is a longer wavelength than UVB and therefore penetrates deeper into the skin leading to immediate and delayed tanning, photoaging, and skin cancer. 70-80% of UVA and UVB radiation occurs between the hours of 10am-2pm.  UVB radiation  UVB radiation causes the most harmful effects and is more significant during the summer months. However, snow and ice can reflect UVB radiation leading to skin damage during the winter months as well. UVB radiation is responsible for tanning, burning, inflammation, delayed erythema (pinkness), pigmentation (brown spots), and skin cancer.

## 2020-01-20 ENCOUNTER — TELEPHONE (OUTPATIENT)
Dept: FAMILY MEDICINE | Facility: CLINIC | Age: 50
End: 2020-01-20

## 2020-01-20 ENCOUNTER — TELEPHONE (OUTPATIENT)
Dept: ALLERGY | Facility: CLINIC | Age: 50
End: 2020-01-20

## 2020-01-20 ENCOUNTER — OFFICE VISIT (OUTPATIENT)
Dept: OPTOMETRY | Facility: CLINIC | Age: 50
End: 2020-01-20
Payer: COMMERCIAL

## 2020-01-20 DIAGNOSIS — H00.015 HORDEOLUM EXTERNUM OF LEFT LOWER EYELID: ICD-10-CM

## 2020-01-20 DIAGNOSIS — H52.13 HIGH MYOPIA, BILATERAL: Primary | ICD-10-CM

## 2020-01-20 LAB — COPATH REPORT: NORMAL

## 2020-01-20 PROCEDURE — 92310 CONTACT LENS FITTING OU: CPT | Performed by: OPTOMETRIST

## 2020-01-20 PROCEDURE — 92015 DETERMINE REFRACTIVE STATE: CPT | Performed by: OPTOMETRIST

## 2020-01-20 PROCEDURE — 92014 COMPRE OPH EXAM EST PT 1/>: CPT | Performed by: OPTOMETRIST

## 2020-01-20 ASSESSMENT — CONF VISUAL FIELD
METHOD: COUNTING FINGERS
OD_NORMAL: 1
OS_NORMAL: 1

## 2020-01-20 ASSESSMENT — VISUAL ACUITY
OS_CC: 20/20
CORRECTION_TYPE: CONTACTS
OS_CC: 20/30
OD_CC: 20/30
OD_CC: 20/30-3
OD_CC+: +1
METHOD: SNELLEN - LINEAR

## 2020-01-20 ASSESSMENT — REFRACTION_CURRENTRX
OS_BRAND: J&J 1-DAY ACUVUE MOIST BC 8.5, D 14.2
OD_BRAND: J&J 1-DAY ACUVUE MOIST BC 8.5, D 14.2
OD_BRAND: ALCON DAILIES TOTAL 1 BC 8.5, D 14.1
OS_BRAND: ALCON DAILIES TOTAL 1 BC 8.5, D 14.1

## 2020-01-20 ASSESSMENT — REFRACTION_MANIFEST
OS_SPHERE: -9.50
OD_ADD: +1.00
OS_ADD: +1.00
OD_SPHERE: -9.50

## 2020-01-20 ASSESSMENT — CUP TO DISC RATIO
OD_RATIO: 0.3
OS_RATIO: 0.3

## 2020-01-20 ASSESSMENT — EXTERNAL EXAM - LEFT EYE: OS_EXAM: NORMAL

## 2020-01-20 ASSESSMENT — TONOMETRY
OD_IOP_MMHG: 15
OS_IOP_MMHG: 15
IOP_METHOD: APPLANATION

## 2020-01-20 ASSESSMENT — REFRACTION_WEARINGRX
OD_SPHERE: -9.25
OS_CYLINDER: SPHERE
OS_SPHERE: -9.25
OD_CYLINDER: SPHERE
SPECS_TYPE: BROKE

## 2020-01-20 ASSESSMENT — SLIT LAMP EXAM - LIDS: COMMENTS: NORMAL

## 2020-01-20 ASSESSMENT — EXTERNAL EXAM - RIGHT EYE: OD_EXAM: NORMAL

## 2020-01-20 NOTE — TELEPHONE ENCOUNTER
Left message on answering machine for patient to call back.      Slime RUDOLPHRN BSN  [facility]  [phone]

## 2020-01-20 NOTE — PROGRESS NOTES
Chief Complaint   Patient presents with     Annual Eye Exam     Contact Lens Evaluation     DILAN: 2018  Stable vision  Concerned about a stye left eye - been there about a month. Been doing warm compress, no help.   No other concerns.     Previous contact lens wearer? Yes: 1 day AV Oasys  Comfort of contact lenses : Good  Satisfied with current lenses: Yes        Last Eye Exam: 2018  Dilated Previously: Yes    What are you currently using to see?  contacts    Distance Vision Acuity: Satisfied with vision    Near Vision Acuity: Satisfied with vision while reading and using computer with cls    Eye Comfort: good  Do you use eye drops? : Patanol PRN  Occupation or Hobbies: [place]: [employer]  Has 2 grandkids    Lavonne Huerta CPO     Medical, surgical and family histories reviewed and updated 1/20/2020.     POHX:   History of iritis     OBJECTIVE: See Ophthalmology exam    ASSESSMENT:    ICD-10-CM    1. High myopia, bilateral H52.13 CONTACT LENS FITTING,BILAT     REFRACTION     EYE EXAM (SIMPLE-NONBILLABLE)   2. Hordeolum externum of left lower eyelid H00.015       PLAN:   Lives in [place], so could see [facility] for Chalazion removal if warm compresses do not resolve the stye    Update prescription refit contacts DT1    Sasha Irizarry OD

## 2020-01-20 NOTE — TELEPHONE ENCOUNTER
"----- Message from Betty Thakur PA-C sent at 1/20/2020  3:56 PM CST -----  A. Skin, mid chest, shave:   - Lentiginous compound melanocytic nevus with moderate atypia - (see   comment)     Benign growth-watch and monitor  Moderate atypical cells- not a cancer but if regrows please follow up.     B. Skin, right posterior thigh, shave:   - Epidermolytic acanthoma ( benign growth of the hair follicle ) This is a benign lesion that does not need any additional treatment.     Atypical moles are unusual benign moles that may resemble melanoma. People who have them are at increased risk of developing single or multiple melanomas. The higher the number of these moles someone has, the higher the risk; those who have 10 or more have 12 times the risk of developing melanoma compared to the general population. Atypical nevi are found significantly more often in melanoma patients than in the general population.    While atypical moles are considered to be pre-cancerous (more likely to turn into melanoma than regular moles), not everyone who has atypical moles gets melanoma. In fact, most moles -- both ordinary and atypical ones -- never become cancerous. Thus the removal of all atypical nevi is unnecessary. In fact, most of the melanomas found on people with atypical moles arise from normal skin and not an atypical mole.    Although a physician bases the initial diagnosis of atypical moles on a physical examination, removing several moles and examining them under a microscope must confirm the diagnosis. This procedure, called a biopsy.    A pathologist will examine the tissue under a microscope and make the precise diagnosis. Diagnosis by biopsy is not exact, and in difficult cases doctors may split 50/50 down the middle as to whether a mole is melanoma or benign. If the pathologist uses the term \"severely dysplastic\" or \"atypical melanocytic hyperplasia\" or offers a long descriptive narrative it means he really is " "concerned about melanoma, but does not want to call it that.    Most dermatologists usually recommend that all patients with these severely dysplastic moles have them removed. Many dermatologists recommend removing \"moderate dysplasia\" moles, if the biopsy didn't get all of it. Those with \"mild dysplasia\" can usually be left alone or watched.    "

## 2020-01-20 NOTE — TELEPHONE ENCOUNTER
Attempted to call patient to remind her to be off of antihistamine medications, oral steroids, and H2 blockers. Unable to leave voicemail.

## 2020-01-20 NOTE — LETTER
1/20/2020         RE: Hannah Sung  [address]  [facility] [address]        Dear Colleague,    Thank you for referring your patient, Hannah Sung, to the [facility]. Please see a copy of my visit note below.    Chief Complaint   Patient presents with     Annual Eye Exam     Contact Lens Evaluation     DILAN: 2018  Stable vision  Concerned about a stye left eye - been there about a month. Been doing warm compress, no help.   No other concerns.     Previous contact lens wearer? Yes: 1 day AV Oasys  Comfort of contact lenses : Good  Satisfied with current lenses: Yes        Last Eye Exam: 2018  Dilated Previously: Yes    What are you currently using to see?  contacts    Distance Vision Acuity: Satisfied with vision    Near Vision Acuity: Satisfied with vision while reading and using computer with cls    Eye Comfort: good  Do you use eye drops? : Patanol LEVI  Occupation or Hobbies: [place]: [employer]  Has 2 grandkids    Lavonne Huerta CPO     Medical, surgical and family histories reviewed and updated 1/20/2020.     POHX:   History of iritis     OBJECTIVE: See Ophthalmology exam    ASSESSMENT:    ICD-10-CM    1. High myopia, bilateral H52.13 CONTACT LENS FITTING,BILAT     REFRACTION     EYE EXAM (SIMPLE-NONBILLABLE)   2. Hordeolum externum of left lower eyelid H00.015       PLAN:   Lives in [place], so could see [facility] for Chalazion removal if warm compresses do not resolve the stye    Update prescription refit contacts DT1    Sasha Irizarry OD     Again, thank you for allowing me to participate in the care of your patient.        Sincerely,        Sasha Irizarry, OD

## 2020-01-20 NOTE — TELEPHONE ENCOUNTER
Left message on answering machine for patient to call back.  Slime RUDOLPHRN BSN  [facility]  [phone]

## 2020-01-20 NOTE — PATIENT INSTRUCTIONS
You may use rewetting drops such as blink or refresh contacts over lenses   and artificial tears when lenses are drying out    There are over the counter drops that work well and may be used up to 4 x daily when lenses are out if your eyes are dry. ( systane , refresh, thera tears) If you need more than 4 drops daily, use a preservative free product which come in individual vials and may be used for 24 hours until finished and discarded.     If you are in not in dailies, consider clear care solution if still having problems and reduce wear time to 10-12 hours     I am going to increase the distance power slightly on your contacts, which may cause you to need readers a little more  Use +1.00 or +1.25   If warm compresses do not resolve lower lid lesion see [facility] or [facility] location / you can just leave it if it is small      go on the [facility] website and find a provider, to take your prescription for contacts and they will bill them a portion

## 2020-01-22 ENCOUNTER — TELEPHONE (OUTPATIENT)
Dept: ALLERGY | Facility: CLINIC | Age: 50
End: 2020-01-22

## 2020-01-22 NOTE — TELEPHONE ENCOUNTER
Patient notified of test results and providers message, patient has no further questions.  Sent provider results/message to patient via [employer] per patient request  Slime RUDOLPHRN BSN  [facility]  [phone]

## 2020-01-22 NOTE — TELEPHONE ENCOUNTER
Left message on answering machine for patient to call back.  Sent [employer] to call the clinic    Slime RUDOLPH RN BSN  [facility]  [phone]

## 2020-01-22 NOTE — TELEPHONE ENCOUNTER
Attempted to call patient for 2nd time to remind her to stop taking antihistamines, oral steroids, and H2 blockers prior to her appointment on 1/27. No answer and unable to leave message.

## 2020-01-27 ENCOUNTER — PRE VISIT (OUTPATIENT)
Dept: ALLERGY | Facility: CLINIC | Age: 50
End: 2020-01-27

## 2020-02-03 ENCOUNTER — TELEPHONE (OUTPATIENT)
Dept: PULMONOLOGY | Facility: CLINIC | Age: 50
End: 2020-02-03

## 2020-07-22 DIAGNOSIS — J45.30 MILD PERSISTENT ASTHMA WITHOUT COMPLICATION: ICD-10-CM

## 2020-07-22 DIAGNOSIS — H10.45 CHRONIC ALLERGIC CONJUNCTIVITIS: ICD-10-CM

## 2020-07-23 NOTE — TELEPHONE ENCOUNTER
Routing refill request to provider for review/approval because:  Patient established care with new PCP - routing to new PCP to review and sign.

## 2020-07-27 RX ORDER — ALBUTEROL SULFATE 90 UG/1
2 AEROSOL, METERED RESPIRATORY (INHALATION) EVERY 6 HOURS PRN
Qty: 8.5 G | Refills: 0 | Status: SHIPPED | OUTPATIENT
Start: 2020-07-27 | End: 2021-08-05

## 2020-07-27 RX ORDER — DEXAMETHASONE 4 MG/1
2 TABLET ORAL 2 TIMES DAILY
Qty: 12 G | Refills: 0 | Status: SHIPPED | OUTPATIENT
Start: 2020-07-27 | End: 2021-08-05

## 2020-07-27 NOTE — TELEPHONE ENCOUNTER
Last Clinic Visit: 12/31/19   Follow-up April 2020  Pt outside of RTC timeframe.  30 day zeferino refill sent to the pharmacy - including instructions for patient to call the clinic and schedule an appointment.    Warnings Override History for albuterol (PROAIR HFA/PROVENTIL HFA/VENTOLIN HFA) 108 (90 Base) MCG/ACT inhaler [[id number]]     Overridden by Janna Lazaro NP on Dec 31, 2019 8:36 AM    Drug-Drug    1. BETA-ADRENERGIC BLOCKERS / SYMPATHOMIMETICS [Level: Major]    Other Orders:  propranolol (INDERAL) 20 MG tablet       2. BETA-ADRENERGIC BLOCKERS / SYMPATHOMIMETICS [Level: Major]    Other Orders:  propranolol (INDERAL) 20 MG tablet

## 2020-07-28 RX ORDER — FLUTICASONE PROPIONATE 50 MCG
SPRAY, SUSPENSION (ML) NASAL
Qty: 16 G | Refills: 2 | Status: SHIPPED | OUTPATIENT
Start: 2020-07-28 | End: 2021-08-05

## 2020-11-22 ENCOUNTER — HEALTH MAINTENANCE LETTER (OUTPATIENT)
Age: 50
End: 2020-11-22

## 2020-12-22 ASSESSMENT — REFRACTION_CURRENTRX
OD_SPHERE: -8.00
OS_SPHERE: -8.00

## 2021-04-04 ENCOUNTER — HEALTH MAINTENANCE LETTER (OUTPATIENT)
Age: 51
End: 2021-04-04

## 2021-08-05 ENCOUNTER — OFFICE VISIT (OUTPATIENT)
Dept: FAMILY MEDICINE | Facility: CLINIC | Age: 51
End: 2021-08-05

## 2021-08-05 VITALS
DIASTOLIC BLOOD PRESSURE: 81 MMHG | WEIGHT: 176.8 LBS | HEIGHT: 64 IN | OXYGEN SATURATION: 97 % | HEART RATE: 66 BPM | TEMPERATURE: 97.4 F | BODY MASS INDEX: 30.19 KG/M2 | SYSTOLIC BLOOD PRESSURE: 127 MMHG
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
## 2021-08-05 DIAGNOSIS — L03.90 CELLULITIS, UNSPECIFIED CELLULITIS SITE: Primary | ICD-10-CM

## 2021-08-05 DIAGNOSIS — F41.9 ANXIETY: ICD-10-CM

## 2021-08-05 DIAGNOSIS — H10.45 CHRONIC ALLERGIC CONJUNCTIVITIS: ICD-10-CM

## 2021-08-05 DIAGNOSIS — J45.30 MILD PERSISTENT ASTHMA WITHOUT COMPLICATION: ICD-10-CM

## 2021-08-05 PROCEDURE — 99214 OFFICE O/P EST MOD 30 MIN: CPT | Performed by: NURSE PRACTITIONER

## 2021-08-05 RX ORDER — FLUTICASONE PROPIONATE 50 MCG
SPRAY, SUSPENSION (ML) NASAL
Qty: 16 G | Refills: 2 | Status: SHIPPED | OUTPATIENT
Start: 2021-08-05

## 2021-08-05 RX ORDER — BENZOCAINE/MENTHOL 6 MG-10 MG
LOZENGE MUCOUS MEMBRANE 2 TIMES DAILY
Qty: 30 G | Refills: 1 | Status: SHIPPED | OUTPATIENT
Start: 2021-08-05

## 2021-08-05 RX ORDER — ALBUTEROL SULFATE 0.83 MG/ML
2.5 SOLUTION RESPIRATORY (INHALATION) EVERY 6 HOURS PRN
Qty: 75 ML | Refills: 1 | Status: SHIPPED | OUTPATIENT
Start: 2021-08-05

## 2021-08-05 RX ORDER — ALBUTEROL SULFATE 5 MG/ML
2.5 SOLUTION RESPIRATORY (INHALATION) EVERY 6 HOURS PRN
Qty: 75 ML | Refills: 1 | Status: SHIPPED | OUTPATIENT
Start: 2021-08-05 | End: 2021-08-05

## 2021-08-05 RX ORDER — ALPRAZOLAM 0.5 MG
0.5 TABLET ORAL DAILY PRN
Qty: 30 TABLET | Refills: 0 | Status: SHIPPED | OUTPATIENT
Start: 2021-08-05 | End: 2022-02-16

## 2021-08-05 RX ORDER — DOXYCYCLINE 100 MG/1
100 CAPSULE ORAL 2 TIMES DAILY
Qty: 20 CAPSULE | Refills: 0 | Status: SHIPPED | OUTPATIENT
Start: 2021-08-05 | End: 2021-08-15

## 2021-08-05 RX ORDER — DEXAMETHASONE 4 MG/1
2 TABLET ORAL 2 TIMES DAILY
Qty: 12 G | Refills: 1 | Status: SHIPPED | OUTPATIENT
Start: 2021-08-05

## 2021-08-05 RX ORDER — ALBUTEROL SULFATE 90 UG/1
2 AEROSOL, METERED RESPIRATORY (INHALATION) EVERY 6 HOURS
Qty: 8.5 G | Refills: 1 | Status: SHIPPED | OUTPATIENT
Start: 2021-08-05

## 2021-08-05 ASSESSMENT — MIFFLIN-ST. JEOR: SCORE: 1391.96

## 2021-08-05 NOTE — PROGRESS NOTES
"    Assessment & Plan     Cellulitis, unspecified cellulitis site  Infected bites likely from itching. Will treat with doxycycline to cover for lymes in the event this is a tick as well due to 1 lesion looking somewhat bullseye-like  - doxycycline hyclate (VIBRAMYCIN) 100 MG capsule; Take 1 capsule (100 mg) by mouth 2 times daily for 10 days    Insect bite, unspecified site, initial encounter  As above  - doxycycline hyclate (VIBRAMYCIN) 100 MG capsule; Take 1 capsule (100 mg) by mouth 2 times daily for 10 days  - hydrocortisone (CORTAID) 1 % external cream; Apply topically 2 times daily    Mild persistent asthma without complication  Refilled. Doing well.  - albuterol (PROAIR HFA/PROVENTIL HFA/VENTOLIN HFA) 108 (90 Base) MCG/ACT inhaler; Inhale 2 puffs into the lungs every 6 hours  - fluticasone (FLOVENT HFA) 110 MCG/ACT inhaler; Inhale 2 puffs into the lungs 2 times daily For more refills,schedule an appointment  - albuterol (PROVENTIL) (2.5 MG/3ML) 0.083% neb solution; Take 1 vial (2.5 mg) by nebulization every 6 hours as needed for shortness of breath / dyspnea or wheezing    Anxiety  Refilled. Last refill 2019.  - ALPRAZolam (XANAX) 0.5 MG tablet; Take 1 tablet (0.5 mg) by mouth daily as needed for anxiety    Chronic allergic conjunctivitis  Refilled.   - fluticasone (FLONASE) 50 MCG/ACT nasal spray; USE TWO SPRAYS IN EACH NOSTRIL ONCE DAILY    Recommend physical for health maintenance once she has insurance again.      BMI:   Estimated body mass index is 30.35 kg/m  as calculated from the following:    Height as of this encounter: 1.626 m (5' 4\").    Weight as of this encounter: 80.2 kg (176 lb 12.8 oz).       See Patient Instructions    Return in about 4 weeks (around 9/2/2021), or if symptoms worsen or fail to improve.     The benefits, risks and potential side effects were discussed in detail. Black box warnings discussed as relevant. All patient questions were answered. The patient was instructed to follow " "up immediately if any adverse reactions develop.    Return precautions discussed, including when to seek urgent/emergent care.    Patient verbalizes understanding and agrees with plan of care. Patient stable for discharge.      SAVI Wei CNP [facility] HospitalSHANEKA Young is a 51 year old who presents for the following health issues     HPI       Pleasant 51 year old female presents with concerns for insect bites  6 weeks ago was golfing  Had itching and now its a scab  A week ago driving home and something bit arm  Looked like a bite but puss would come out  Now has lesoin to left inside thigh  Has lesions behind right knee that is draining  No fevers or myalgias    Needs refills of some of her medication - Doesn't currently have insurance    Asthma - currently well controlled. Takes albuterol inhaler and/or neb. Supposed to take flovent but currently without insurance. Doing ok without it at this time.    Needs refill of alprazolam. Takes sparingly. Last refill 2019. Going on a plane soon and takes for flying.          Review of Systems   Constitutional, HEENT, cardiovascular, pulmonary, GI, , musculoskeletal, neuro, skin, endocrine and psych systems are negative, except as otherwise noted.      Objective    /81 (BP Location: Left arm, Patient Position: Sitting, Cuff Size: Adult Regular)   Pulse 66   Temp 97.4  F (36.3  C) (Tympanic)   Ht 1.626 m (5' 4\")   Wt 80.2 kg (176 lb 12.8 oz)   SpO2 97%   BMI 30.35 kg/m    Body mass index is 30.35 kg/m .  Physical Exam   GENERAL: healthy, alert and no distress  RESP: lungs clear to auscultation - no rales, rhonchi or wheezes  CV: regular rate and rhythm, normal S1 S2, no S3 or S4, no murmur, click or rub, no peripheral edema and peripheral pulses strong  MS: no gross musculoskeletal defects noted, no edema  SKIN: left medial thigh with annular erythema with darker red line around it - somewhat of a bullseye lesion. She " also has lesions behind both knees and upper left arm  PSYCH: mentation appears normal, affect normal/bright    No results found for any visits on 08/05/21.

## 2021-08-06 ASSESSMENT — ASTHMA QUESTIONNAIRES: ACT_TOTALSCORE: 20

## 2021-09-18 ENCOUNTER — HEALTH MAINTENANCE LETTER (OUTPATIENT)
Age: 51
End: 2021-09-18

## 2022-01-08 ENCOUNTER — HEALTH MAINTENANCE LETTER (OUTPATIENT)
Age: 52
End: 2022-01-08

## 2022-01-13 ENCOUNTER — MYC MEDICAL ADVICE (OUTPATIENT)
Dept: FAMILY MEDICINE | Facility: CLINIC | Age: 52
End: 2022-01-13

## 2022-01-13 ENCOUNTER — E-VISIT (OUTPATIENT)
Dept: FAMILY MEDICINE | Facility: CLINIC | Age: 52
End: 2022-01-13

## 2022-01-13 DIAGNOSIS — Z53.9 ERRONEOUS ENCOUNTER--DISREGARD: Primary | ICD-10-CM

## 2022-01-13 ASSESSMENT — ANXIETY QUESTIONNAIRES
GAD7 TOTAL SCORE: 10
1. FEELING NERVOUS, ANXIOUS, OR ON EDGE: MORE THAN HALF THE DAYS
7. FEELING AFRAID AS IF SOMETHING AWFUL MIGHT HAPPEN: MORE THAN HALF THE DAYS
8. IF YOU CHECKED OFF ANY PROBLEMS, HOW DIFFICULT HAVE THESE MADE IT FOR YOU TO DO YOUR WORK, TAKE CARE OF THINGS AT HOME, OR GET ALONG WITH OTHER PEOPLE?: SOMEWHAT DIFFICULT
GAD7 TOTAL SCORE: 10
3. WORRYING TOO MUCH ABOUT DIFFERENT THINGS: MORE THAN HALF THE DAYS
2. NOT BEING ABLE TO STOP OR CONTROL WORRYING: MORE THAN HALF THE DAYS
5. BEING SO RESTLESS THAT IT IS HARD TO SIT STILL: NOT AT ALL
6. BECOMING EASILY ANNOYED OR IRRITABLE: NOT AT ALL
7. FEELING AFRAID AS IF SOMETHING AWFUL MIGHT HAPPEN: MORE THAN HALF THE DAYS
GAD7 TOTAL SCORE: 10
4. TROUBLE RELAXING: MORE THAN HALF THE DAYS

## 2022-01-14 ASSESSMENT — ANXIETY QUESTIONNAIRES: GAD7 TOTAL SCORE: 10

## 2022-01-17 NOTE — TELEPHONE ENCOUNTER
Provider E-Visit time total (minutes): patient has not responded to any requests for more info. Closing this encounter.

## 2022-02-15 DIAGNOSIS — F41.9 ANXIETY: ICD-10-CM

## 2022-02-16 RX ORDER — ALPRAZOLAM 0.5 MG
0.5 TABLET ORAL DAILY PRN
Qty: 30 TABLET | Refills: 0 | Status: SHIPPED | OUTPATIENT
Start: 2022-02-16

## 2022-02-16 NOTE — TELEPHONE ENCOUNTER
Routing refill request to provider for review/approval because:  Drug not on the [facility] refill protocol   Lenora Wallace BSN, RN

## 2022-04-30 ENCOUNTER — HEALTH MAINTENANCE LETTER (OUTPATIENT)
Age: 52
End: 2022-04-30

## 2022-11-20 ENCOUNTER — HEALTH MAINTENANCE LETTER (OUTPATIENT)
Age: 52
End: 2022-11-20

## 2023-04-15 ENCOUNTER — HEALTH MAINTENANCE LETTER (OUTPATIENT)
Age: 53
End: 2023-04-15

## 2023-07-08 ENCOUNTER — HEALTH MAINTENANCE LETTER (OUTPATIENT)
Age: 53
End: 2023-07-08

## 2024-06-22 ENCOUNTER — HEALTH MAINTENANCE LETTER (OUTPATIENT)
Age: 54
End: 2024-06-22

## 2024-08-31 ENCOUNTER — HEALTH MAINTENANCE LETTER (OUTPATIENT)
Age: 54
End: 2024-08-31